# Patient Record
Sex: FEMALE | Race: WHITE | Employment: UNEMPLOYED | URBAN - METROPOLITAN AREA
[De-identification: names, ages, dates, MRNs, and addresses within clinical notes are randomized per-mention and may not be internally consistent; named-entity substitution may affect disease eponyms.]

---

## 2018-03-21 ENCOUNTER — OFFICE VISIT (OUTPATIENT)
Dept: OBGYN | Facility: CLINIC | Age: 23
End: 2018-03-21
Payer: COMMERCIAL

## 2018-03-21 VITALS
WEIGHT: 201.9 LBS | BODY MASS INDEX: 34.47 KG/M2 | DIASTOLIC BLOOD PRESSURE: 80 MMHG | HEIGHT: 64 IN | SYSTOLIC BLOOD PRESSURE: 116 MMHG | HEART RATE: 74 BPM

## 2018-03-21 DIAGNOSIS — Z01.419 ENCOUNTER FOR GYNECOLOGICAL EXAMINATION WITHOUT ABNORMAL FINDING: ICD-10-CM

## 2018-03-21 DIAGNOSIS — Z30.41 ENCOUNTER FOR BIRTH CONTROL PILLS MAINTENANCE: Primary | ICD-10-CM

## 2018-03-21 PROCEDURE — 99385 PREV VISIT NEW AGE 18-39: CPT | Mod: 25 | Performed by: ADVANCED PRACTICE MIDWIFE

## 2018-03-21 PROCEDURE — 2894A VOIDCORRECT: CPT | Performed by: ADVANCED PRACTICE MIDWIFE

## 2018-03-21 PROCEDURE — G0123 SCREEN CERV/VAG THIN LAYER: HCPCS | Performed by: ADVANCED PRACTICE MIDWIFE

## 2018-03-21 RX ORDER — NORETHINDRONE ACETATE AND ETHINYL ESTRADIOL 1MG-20(21)
1 KIT ORAL DAILY
Qty: 84 TABLET | Refills: 3 | Status: SHIPPED | OUTPATIENT
Start: 2018-03-21 | End: 2018-06-19

## 2018-03-21 NOTE — NURSING NOTE
"Chief Complaint   Patient presents with     Gyn Exam     Patient due for pap, unsure of last pap date- 4 years ago and was NIL     Abdominal Cramping     everyday for the past 6 weeks. Patient has hx of  Right ovarian cyst       Initial /80 (BP Location: Right arm, Patient Position: Chair, Cuff Size: Adult Regular)  Pulse 74  Ht 5' 4\" (1.626 m)  Wt 201 lb 14.4 oz (91.6 kg)  LMP 03/14/2018  BMI 34.66 kg/m2 Estimated body mass index is 34.66 kg/(m^2) as calculated from the following:    Height as of this encounter: 5' 4\" (1.626 m).    Weight as of this encounter: 201 lb 14.4 oz (91.6 kg).  Medication Reconciliation: complete     John Guillen, CMA      "

## 2018-03-21 NOTE — MR AVS SNAPSHOT
"              After Visit Summary   3/21/2018    Kirby Houser    MRN: [id number]           Patient Information     Date Of Birth          1995        Visit Information        Provider Department      3/21/2018 9:30 AM Ileana Alexandre APRN CNM [facility]        Today's Diagnoses     Encounter for birth control pills maintenance    -  1    Encounter for gynecological examination without abnormal finding           Follow-ups after your visit        Follow-up notes from your care team     Return in about 1 year (around 3/21/2019), or if symptoms worsen or fail to improve.      Who to contact     If you have questions or need follow up information about today's clinic visit or your schedule please contact [facility] directly at [phone].  Normal or non-critical lab and imaging results will be communicated to you by MyChart, letter or phone within 4 business days after the clinic has received the results. If you do not hear from us within 7 days, please contact the clinic through MyChart or phone. If you have a critical or abnormal lab result, we will notify you by phone as soon as possible.  Submit refill requests through [employer] or call your pharmacy and they will forward the refill request to us. Please allow 3 business days for your refill to be completed.          Additional Information About Your Visit        MyChart Information     [employer] lets you send messages to your doctor, view your test results, renew your prescriptions, schedule appointments and more. To sign up, go to [email or web address] . Click on \"Log in\" on the left side of the screen, which will take you to the Welcome page. Then click on \"Sign up Now\" on the right side of the page.     You will be asked to enter the access code listed below, as well as some personal information. Please follow the directions to create your username and password.     Your access code is: " [email or web address]  Expires: 2018  3:48 PM     Your access code will  in 90 days. If you need help or a new code, please call your [facility] clinic or [phone].        Care EveryWhere ID     This is your Care EveryWhere ID. This could be used by other organizations to access your [facility] medical records  [id number]        Your Vitals Were     Pulse Height Last Period BMI (Body Mass Index)          74 5' 4\" (1.626 m) 2018 34.66 kg/m2         Blood Pressure from Last 3 Encounters:   18 116/80   09/29/15 156/85   09/13/15 107/60    Weight from Last 3 Encounters:   18 201 lb 14.4 oz (91.6 kg)   09/29/15 191 lb 6.4 oz (86.8 kg)   09/12/15 188 lb 11.2 oz (85.6 kg)              We Performed the Following     Pap imaged thin layer screen only - recommended age 21 - 24 years          Today's Medication Changes          These changes are accurate as of 3/21/18  3:48 PM.  If you have any questions, ask your nurse or doctor.               Start taking these medicines.        Dose/Directions    norethindrone-ethinyl estradiol 1-20 MG-MCG per tablet   Commonly known as:  JUNEL FE 1/20   Used for:  Encounter for birth control pills maintenance   Started by:  Ileana Alexandre APRN CNM        Dose:  1 tablet   Take 1 tablet by mouth daily   Quantity:  84 tablet   Refills:  3            Where to get your medicines      These medications were sent to [facility] [address] [address]     Phone:  [phone]     norethindrone-ethinyl estradiol 1-20 MG-MCG per tablet                Primary Care Provider Office Phone # Fax #    [facility] [phone]       [address] [place] [address]        Equal Access to Services     SON LOMBARDI AH: Alan Silveira, kar brooks, carmina josephn ah. [facility] [phone].    ATENCIÓN: Si habla " español, tiene a shannon disposición servicios gratuitos de asistencia lingüística. Philip grijalva [phone].    We comply with applicable federal civil rights laws and [place] laws. We do not discriminate on the basis of race, color, national origin, age, disability, sex, sexual orientation, or gender identity.            Thank you!     Thank you for choosing [facility]  for your care. Our goal is always to provide you with excellent care. Hearing back from our patients is one way we can continue to improve our services. Please take a few minutes to complete the written survey that you may receive in the mail after your visit with us. Thank you!             Your Updated Medication List - Protect others around you: Learn how to safely use, store and throw away your medicines at www.disposemymeds.org.          This list is accurate as of 3/21/18  3:48 PM.  Always use your most recent med list.                   Brand Name Dispense Instructions for use Diagnosis    albuterol 108 (90 BASE) MCG/ACT Inhaler    PROAIR HFA/PROVENTIL HFA/VENTOLIN HFA     Inhale 2 puffs into the lungs as needed for shortness of breath / dyspnea or wheezing        norethindrone-ethinyl estradiol 1-20 MG-MCG per tablet    JUNEL FE 1/20    84 tablet    Take 1 tablet by mouth daily    Encounter for birth control pills maintenance

## 2018-03-21 NOTE — LETTER
March 30, 2018      Kirby Houser  [address]   [place] [address]    Dear ,      I am happy to inform you that your recent cervical cancer screening test (PAP smear) was normal.      Preventative screenings such as this help to ensure your health for years to come. You should repeat a pap smear in 3 years, unless otherwise directed.      You will still need to return to the clinic every year for your annual exam and other preventive tests.     Please contact the clinic at [phone] if you have further questions.       Sincerely,      ASHU Davila CNM/oskar

## 2018-03-21 NOTE — PROGRESS NOTES
SUBJECTIVE:                                                   Kirby Houser is a 23 year old who presents to clinic today for the following health issue(s):  Patient presents with:  Gyn Exam: Patient due for pap, unsure of last pap date- 4 years ago and was NIL  Abdominal Cramping: everyday for the past 6 weeks. Patient has hx of  Right ovarian cyst      HPI: Here for Pap and annual well woman exam.  Recently discontinued OCPs, about 2-3 weeks after discontinuation experiencing cramping. Reports feeling cramping every day, in AM and late at night.  Occurs with and without menses.  Denies one sided tenderness, urinary symptoms, vaginal symptoms, bowel changes.  Taking tylenol/ibuprofen for cramping states it helps. Hx of ovarian cyst.  Patient was adopted uncertain of [facility].  Denies personal hx of breast cancer, VTE, non smoker, migraines, liver disease.  Patient is interested in re starting birth control if it will help with cramping.     Patient's last menstrual period was 2018.  Menstrual History: frequency: every 28-30 days  Patient is sexually active  .  Using condoms for contraception.   Health maintenance updated:  yes  STI infx testing offered:  Declined      Problem list and histories reviewed & adjusted, as indicated.  Additional history: as documented.    Patient Active Problem List   Diagnosis     Hemorrhagic cyst of ovary     Past Surgical History:   Procedure Laterality Date     ABDOMEN SURGERY      lymph node removal      Social History   Substance Use Topics     Smoking status: Never Smoker     Smokeless tobacco: Never Used     Alcohol use Yes      *Patient is Adopted       *Family history is unknown by patient.             Current Outpatient Prescriptions   Medication Sig     albuterol (PROAIR HFA, PROVENTIL HFA, VENTOLIN HFA) 108 (90 BASE) MCG/ACT inhaler Inhale 2 puffs into the lungs as needed for shortness of breath / dyspnea or wheezing     No current facility-administered  "medications for this visit.      No Known Allergies    ROS:  C: NEGATIVE for fever, chills, change in weight  E: NEGATIVE for vision changes or irritation  R: NEGATIVE for significant cough or SOB  B: NEGATIVE for masses, tenderness or discharge  CV: NEGATIVE for chest pain, palpitations or peripheral edema  GI: NEGATIVE for nausea, abdominal pain, heartburn, or change in bowel habits  : NEGATIVE for unusual urinary or vaginal symptoms. Periods are regular.  P: NEGATIVE for changes in mood or affect    OBJECTIVE:     /80 (BP Location: Right arm, Patient Position: Chair, Cuff Size: Adult Regular)  Pulse 74  Ht 5' 4\" (1.626 m)  Wt 201 lb 14.4 oz (91.6 kg)  LMP 03/14/2018  BMI 34.66 kg/m2  Body mass index is 34.66 kg/(m^2).    PHYSICAL EXAM:  Constitutional:  Appearance: Well nourished, well developed alert, in no acute distress  Neck:  Lymph Nodes:  No lymphadenopathy present; Thyroid:  Gland size normal, nontender, no nodules or masses present on palpation  Chest:  Respiratory Effort:  Breathing unlabored  Cardiovascular: Heart: Auscultation:  Regular rate, normal rhythm, no murmurs present  Gastrointestinal:  Abdominal Examination:  Abdomen nontender to palpation, tone normal without rigidity or guarding, no masses present, umbilicus without lesions; Liver/Spleen:  No hepatomegaly present, liver nontender to palpation; Hernias:  No hernias present  Skin:General Inspection:  No rashes present, no lesions present, no areas of discoloration; Genitalia and Groin:  No rashes present, no lesions present, no areas of discoloration, no masses present.  Neurologic/Psychiatric:  Mental Status:  Oriented X3   Pelvic Exam:  External Genitalia:     Normal appearance for age, no discharge present, no tenderness present, no inflammatory lesions present, color normal  Vagina:     Normal vaginal vault without central or paravaginal defects, no discharge present, no inflammatory lesions present, no masses " present  Bladder:     Nontender to palpation  Urethra:   Urethral Body:  Urethra palpation normal, urethra structural support normal   Urethral Meatus:  No erythema or lesions present  Cervix:     Appearance healthy, no lesions present, nontender to palpation, no bleeding present  Uterus:     Uterus: firm, normal sized and nontender, midplane in position.   Adnexa:     No adnexal tenderness present, no adnexal masses present  Perineum:     Perineum within normal limits, no evidence of trauma, no rashes or skin lesions present  Anus:     Anus within normal limits, no hemorrhoids present  Inguinal Lymph Nodes:     No lymphadenopathy present  Pubic Hair:     Normal pubic hair distribution for age  Genitalia and Groin:     No rashes present, no lesions present, no areas of discoloration, no masses present       In-Clinic Test Results:  No results found for this or any previous visit (from the past 24 hour(s)).    ASSESSMENT/PLAN:                                                      (Z30.41) Encounter for birth control pills maintenance  (primary encounter diagnosis)  Comment: ordered   Plan: norethindrone-ethinyl estradiol (JUNEL FE 1/20)        1-20 MG-MCG per tablet, Pap imaged thin layer         screen only - recommended age 21 - 24 years  -Reviewed ACHES, appropriate candidate for OCPs  -discussed dysmenorrhea, COCs may help alleviate symptoms.    -Offered TVUS to evaluate structural causes for dysmenorrhea, reviewed possible symptom of endometriosis would require evaluation by OBGYN.   -Follow up with physician if symptoms worsen          (Z01.419) Encounter for gynecological examination without abnormal finding  Comment: wnl  Plan: return to clinic 1 year for annual well woman       30 minutes was spent face to face with the patient today discussing her history, diagnosis, and follow-up plan as noted above. Over 50% of the visit was spent in counseling and coordination of care.      ASHU Marinelli, MURRAY

## 2018-03-26 LAB
COPATH REPORT: NORMAL
PAP: NORMAL

## 2018-03-30 ENCOUNTER — HOSPITAL ENCOUNTER (OUTPATIENT)
Dept: ULTRASOUND IMAGING | Facility: CLINIC | Age: 23
Discharge: HOME | End: 2018-03-30
Attending: OBSTETRICS & GYNECOLOGY | Admitting: OBSTETRICS & GYNECOLOGY
Payer: COMMERCIAL

## 2018-03-30 ENCOUNTER — OFFICE VISIT (OUTPATIENT)
Dept: OBGYN | Facility: CLINIC | Age: 23
End: 2018-03-30
Payer: COMMERCIAL

## 2018-03-30 VITALS
TEMPERATURE: 98.6 F | WEIGHT: 202 LBS | DIASTOLIC BLOOD PRESSURE: 78 MMHG | SYSTOLIC BLOOD PRESSURE: 112 MMHG | BODY MASS INDEX: 34.67 KG/M2

## 2018-03-30 DIAGNOSIS — R10.9 COMBINED ABDOMINAL AND PELVIC PAIN: ICD-10-CM

## 2018-03-30 DIAGNOSIS — R10.9 COMBINED ABDOMINAL AND PELVIC PAIN: Primary | ICD-10-CM

## 2018-03-30 DIAGNOSIS — R10.2 COMBINED ABDOMINAL AND PELVIC PAIN: Primary | ICD-10-CM

## 2018-03-30 DIAGNOSIS — N83.209 HEMORRHAGIC CYST OF OVARY: ICD-10-CM

## 2018-03-30 DIAGNOSIS — R10.2 COMBINED ABDOMINAL AND PELVIC PAIN: ICD-10-CM

## 2018-03-30 PROCEDURE — 76856 US EXAM PELVIC COMPLETE: CPT

## 2018-03-30 PROCEDURE — 99214 OFFICE O/P EST MOD 30 MIN: CPT | Performed by: OBSTETRICS & GYNECOLOGY

## 2018-03-30 NOTE — PATIENT INSTRUCTIONS
You can reach your [facility] Care Team any time of the day by calling [phone]. This number will put you in touch with the 24 hour nurse line if the clinic is closed.    To contact your OB/GYN Surgery Scheduler please call [phone]. This is a direct number for your care team between 8 a.m. and 4 p.m. Monday through Friday.    [facility] is open for your convenience: [phone]  Monday through Friday 8 a.m. to 8:30 p.m.  Saturday 9 a.m. to 6 p.m.  Sunday Noon to 6 p.m.    They are closed on all major holidays.

## 2018-03-30 NOTE — NURSING NOTE
"Chief Complaint   Patient presents with     Pelvic Pain       Initial /78  Temp 98.6  F (37  C) (Oral)  Wt 202 lb (91.6 kg)  LMP 2018  BMI 34.67 kg/m2 Estimated body mass index is 34.67 kg/(m^2) as calculated from the following:    Height as of 3/21/18: 5' 4\" (1.626 m).    Weight as of this encounter: 202 lb (91.6 kg).  BP completed using cuff size: regular        The following HM Due: NONE      The following patient reported/Care Every where data was sent to:  P ABSTRACT QUALITY INITIATIVES [[id number]]      Chest pain last night  Nausea  ovarian pain     Concha Cardenas CMA                 "

## 2018-03-30 NOTE — MR AVS SNAPSHOT
After Visit Summary   3/30/2018    Kirby Houser    MRN: [id number]           Patient Information     Date Of Birth          1995        Visit Information        Provider Department      3/30/2018 8:45 AM Jakob Rodriguez MD [facility]        Today's Diagnoses     Combined abdominal and pelvic pain    -  1    Hemorrhagic cyst of ovary          Care Instructions    You can reach your [place] Care Team any time of the day by calling [phone]. This number will put you in touch with the 24 hour nurse line if the clinic is closed.    To contact your OB/GYN Surgery Scheduler please call [phone]. This is a direct number for your care team between 8 a.m. and 4 p.m. Monday through Friday.    [facility] is open for your convenience: [phone]  Monday through Friday 8 a.m. to 8:30 p.m.  Saturday 9 a.m. to 6 p.m.  Sunday Noon to 6 p.m.    They are closed on all major holidays.            Follow-ups after your visit        Who to contact     If you have questions or need follow up information about today's clinic visit or your schedule please contact [facility] directly at [phone].  Normal or non-critical lab and imaging results will be communicated to you by 4momshart, letter or phone within 4 business days after the clinic has received the results. If you do not hear from us within 7 days, please contact the clinic through 4momshart or phone. If you have a critical or abnormal lab result, we will notify you by phone as soon as possible.  Submit refill requests through [employer] or call your pharmacy and they will forward the refill request to us. Please allow 3 business days for your refill to be completed.          Additional Information About Your Visit        [employer] Information     [employer] lets you send messages to your doctor, view your test results, renew your prescriptions, schedule appointments and more. To sign up, go to  [email or web address] . Click on \"Log in\" on the left side of the screen, which will take you to the Welcome page. Then click on \"Sign up Now\" on the right side of the page.     You will be asked to enter the access code listed below, as well as some personal information. Please follow the directions to create your username and password.     Your access code is: [email or web address]  Expires: 2018  3:48 PM     Your access code will  in 90 days. If you need help or a new code, please call your [place] clinic or [phone].        Care EveryWhere ID     This is your Care EveryWhere ID. This could be used by other organizations to access your [place] medical records  [id number]        Your Vitals Were     Temperature Last Period BMI (Body Mass Index)             98.6  F (37  C) (Oral) 2018 34.67 kg/m2          Blood Pressure from Last 3 Encounters:   18 112/78   18 116/80   09/29/15 156/85    Weight from Last 3 Encounters:   18 202 lb (91.6 kg)   18 201 lb 14.4 oz (91.6 kg)   09/29/15 191 lb 6.4 oz (86.8 kg)               Primary Care Provider Fax #    Physician No Ref-Primary [phone]       No address on file        Equal Access to Services     SON LOMBARDI AH: Hadii reid bello Soalayna, waaxda luqadaha, qaybta kaalmada adeantoninoda, carmina franklin . So [facility] [phone].    ATENCIÓN: Si habla español, tiene a shannon disposición servicios gratuitos de asistencia lingüística. Llame al [phone].    We comply with applicable federal civil rights laws and [place] laws. We do not discriminate on the basis of race, color, national origin, age, disability, sex, sexual orientation, or gender identity.            Thank you!     Thank you for choosing [facility]  for your care. Our goal is always to provide you with excellent care. Hearing back from our patients is one way we can continue to improve our services. Please take a few " minutes to complete the written survey that you may receive in the mail after your visit with us. Thank you!             Your Updated Medication List - Protect others around you: Learn how to safely use, store and throw away your medicines at www.disposemymeds.org.          This list is accurate as of 3/30/18 11:59 PM.  Always use your most recent med list.                   Brand Name Dispense Instructions for use Diagnosis    albuterol 108 (90 BASE) MCG/ACT Inhaler    PROAIR HFA/PROVENTIL HFA/VENTOLIN HFA     Inhale 2 puffs into the lungs as needed for shortness of breath / dyspnea or wheezing        norethindrone-ethinyl estradiol 1-20 MG-MCG per tablet    JUNEL FE 1/20    84 tablet    Take 1 tablet by mouth daily    Encounter for birth control pills maintenance

## 2018-04-01 NOTE — PROGRESS NOTES
HPI:  Kirby Houser is a 23 year old female  Patient's last menstrual period was 2018.  Who just started [place] 121 week ago for contraception, who presents for evaluation of an acute onset of left lower quadrant abdominal pelvic pain that radiates to the suprapubic area.  The patient's pain began at approximately 8:30 PM last evening.  She describes it as lower abdominal cramping.  She states the pain can also radiate into the epigastric area and to the left shoulder.  She denies symptoms to suggest angina.  The patient's had a history of a ruptured ovarian cyst in the past and said the pain is similar.  Patient states she was laying in bed and when she rolls over or gets up the pain is worse.  Sitting and lying down seem to make the pain better.  She took Advil last evening with some relief.  She has a normal appetite and normal bowel bladder function without fevers chills nausea or vomiting.    Past Medical History:   Diagnosis Date     Ovarian cyst, right      Uncomplicated asthma 2006     Past Surgical History:   Procedure Laterality Date     ABDOMEN SURGERY      lymph node removal     Family History   Problem Relation Age of Onset     Adopted: Yes     Family history unknown: Yes     Social History     Social History     Marital status: Single     Spouse name: N/A     Number of children: N/A     Years of education: N/A     Occupational History     Not on file.     Social History Main Topics     Smoking status: Never Smoker     Smokeless tobacco: Never Used     Alcohol use Yes     Drug use: No     Sexual activity: Yes     Partners: Male     Birth control/ protection: Pill     Other Topics Concern     Not on file     Social History Narrative       Allergies:  Review of patient's allergies indicates no known allergies.    Current Outpatient Prescriptions   Medication Sig Dispense Refill     norethindrone-ethinyl estradiol (JUNEL FE 1/20) 1-20 MG-MCG per tablet Take 1 tablet by mouth daily 84  tablet 3     albuterol (PROAIR HFA, PROVENTIL HFA, VENTOLIN HFA) 108 (90 BASE) MCG/ACT inhaler Inhale 2 puffs into the lungs as needed for shortness of breath / dyspnea or wheezing         Review Of Systems   ROS: 10 point ROS neg other than the symptoms noted above in the HPI.    Exam:  /78  Temp 98.6  F (37  C) (Oral)  Wt 202 lb (91.6 kg)  LMP 03/14/2018  BMI 34.67 kg/m2  {Constitutional: healthy, alert and mild distress  Cardiovascular: negative, PMI normal. No lifts, heaves, or thrills. RRR. No murmurs, clicks gallops or rub  Respiratory: negative, Percussion normal. Good diaphragmatic excursion. Lungs clear  Gastrointestinal: Abdomen soft, mildly tender in the suprapubic and lower abdominal region.  Left lower quadrants more tender to deep palpation right lower quadrant no rebound or guarding. BS normal. No masses, organomegaly  Genitourinary: Normal external genitalia without lesions and speculum nulliparous appearing cervix no lesions, bimanual exam the uterus is retroflexed right adnexa is benign palpation of the left ovary reveals tenderness and duplicates her symptoms.  Rectovaginal exam no uterosacral ligament nodularity    Assessment/Plan:  (R10.30) Combined abdominal and pelvic pain  (primary encounter diagnosis)  Comment: The patient states in the past she has been told she had a history of endometriosis.  While this may be causing some degree of discomfort I do not believe is the cause of her most recent symptoms.  I ordered an ultrasound exam which showed the following  IMPRESSION: Hemorrhagic cyst or ruptured follicle in the left ovary  with a small amount of complex fluid.  I reviewed this finding with the patient discussed the pathophysiology  Plan: US Pelvic Complete w Transvaginal        We discussed activity limitations I discussed the use of ibuprofen and Tylenol for pain.  We discussed the symptoms of concern she will call should these occur.  She will continue with  OCPs    (N83.209) Hemorrhagic cyst of ovary  Comment: If her pain resolves I will see her for routine healthcare if she is worse appropriate intervention will be undertaken  Plan: Written plan given      Jakob Rodriguez M.D.

## 2018-06-19 ENCOUNTER — OFFICE VISIT (OUTPATIENT)
Dept: OBGYN | Facility: CLINIC | Age: 23
End: 2018-06-19
Payer: COMMERCIAL

## 2018-06-19 VITALS — DIASTOLIC BLOOD PRESSURE: 80 MMHG | BODY MASS INDEX: 34.5 KG/M2 | SYSTOLIC BLOOD PRESSURE: 122 MMHG | WEIGHT: 201 LBS

## 2018-06-19 DIAGNOSIS — Z30.41 ENCOUNTER FOR BIRTH CONTROL PILLS MAINTENANCE: Primary | ICD-10-CM

## 2018-06-19 DIAGNOSIS — Z87.42 HISTORY OF OVARIAN CYST: ICD-10-CM

## 2018-06-19 PROCEDURE — 99213 OFFICE O/P EST LOW 20 MIN: CPT | Performed by: ADVANCED PRACTICE MIDWIFE

## 2018-06-19 RX ORDER — NORGESTIMATE AND ETHINYL ESTRADIOL 0.25-0.035
1 KIT ORAL DAILY
Qty: 28 TABLET | Refills: 0 | Status: CANCELLED | OUTPATIENT
Start: 2018-06-19

## 2018-06-19 NOTE — MR AVS SNAPSHOT
After Visit Summary   6/19/2018    Kirby Houser    MRN: [id number]           Patient Information     Date Of Birth          1995        Visit Information        Provider Department      6/19/2018 9:15 AM Ileana Alexandre APRN CNM [facility]        Today's Diagnoses     Encounter for birth control pills maintenance    -  1    History of ovarian cyst           Follow-ups after your visit        Follow-up notes from your care team     Return in about 3 months (around 9/19/2018), or if symptoms worsen or fail to improve.      Future tests that were ordered for you today     Open Future Orders        Priority Expected Expires Ordered    Follicle stimulating hormone Routine  6/19/2019 6/19/2018    Lutropin Routine  6/19/2019 6/19/2018    Testosterone, total Routine  6/19/2019 6/19/2018    Sex Hormone Binding Globulin Routine  6/19/2019 6/19/2018    US Pelvic Complete w Transvaginal Routine  6/19/2019 6/19/2018            Who to contact     If you have questions or need follow up information about today's clinic visit or your schedule please contact [facility] directly at [phone].  Normal or non-critical lab and imaging results will be communicated to you by MyChart, letter or phone within 4 business days after the clinic has received the results. If you do not hear from us within 7 days, please contact the clinic through MyChart or phone. If you have a critical or abnormal lab result, we will notify you by phone as soon as possible.  Submit refill requests through [employer] or call your pharmacy and they will forward the refill request to us. Please allow 3 business days for your refill to be completed.          Additional Information About Your Visit        Care EveryWhere ID     This is your Care EveryWhere ID. This could be used by other organizations to access your [place] medical records  [id number]        Your Vitals Were     Last Period BMI (Body Mass Index)                 06/11/2018 (Exact Date) 34.5 kg/m2           Blood Pressure from Last 3 Encounters:   06/19/18 122/80   03/30/18 112/78   03/21/18 116/80    Weight from Last 3 Encounters:   06/19/18 201 lb (91.2 kg)   03/30/18 202 lb (91.6 kg)   03/21/18 201 lb 14.4 oz (91.6 kg)                 Today's Medication Changes          These changes are accurate as of 6/19/18  2:05 PM.  If you have any questions, ask your nurse or doctor.               Start taking these medicines.        Dose/Directions    norgestrel-ethinyl estradiol 0.3-30 MG-MCG per tablet   Commonly known as:  LO/OVRAL   Used for:  Encounter for birth control pills maintenance   Started by:  Ileana Alexandre APRN CNM        Dose:  1 tablet   Take 1 tablet by mouth daily   Quantity:  28 tablet   Refills:  11         Stop taking these medicines if you haven't already. Please contact your care team if you have questions.     norethindrone-ethinyl estradiol 1-20 MG-MCG per tablet   Commonly known as:  JUNEL FE 1/20   Stopped by:  Ileana Alexandre APRN CNM                Where to get your medicines      These medications were sent to [facility] [address] [address]     Phone:  [phone]     norgestrel-ethinyl estradiol 0.3-30 MG-MCG per tablet                Primary Care Provider Fax #    Physician No Ref-Primary [phone]       No address on file        Equal Access to Services     CAT LOMBARDI : Alan pascual Soalayna, waaxda luqadaha, qaybta kaalmada adejaylon, carmina harvey. So [facility] [phone].    ATENCIÓN: Si habla español, tiene a shannon disposición servicios gratuitos de asistencia lingüística. Llame al [phone].    We comply with applicable federal civil rights laws and [place] laws. We do not discriminate on the basis of race, color, national origin, age, disability, sex, sexual orientation, or gender identity.            Thank  you!     Thank you for choosing [facility]  for your care. Our goal is always to provide you with excellent care. Hearing back from our patients is one way we can continue to improve our services. Please take a few minutes to complete the written survey that you may receive in the mail after your visit with us. Thank you!             Your Updated Medication List - Protect others around you: Learn how to safely use, store and throw away your medicines at www.disposemymeds.org.          This list is accurate as of 6/19/18  2:05 PM.  Always use your most recent med list.                   Brand Name Dispense Instructions for use Diagnosis    albuterol 108 (90 Base) MCG/ACT Inhaler    PROAIR HFA/PROVENTIL HFA/VENTOLIN HFA     Inhale 2 puffs into the lungs as needed for shortness of breath / dyspnea or wheezing        norgestrel-ethinyl estradiol 0.3-30 MG-MCG per tablet    LO/OVRAL    28 tablet    Take 1 tablet by mouth daily    Encounter for birth control pills maintenance

## 2018-06-19 NOTE — NURSING NOTE
"Chief Complaint   Patient presents with     Abdominal Cramping     Daily uterine cramping- cysts on ovaries- painful periods progressively getting worse- on Junel OCP        Initial /80 (BP Location: Right arm, Patient Position: Sitting, Cuff Size: Adult Regular)  Wt 201 lb (91.2 kg)  LMP 2018 (Exact Date)  BMI 34.5 kg/m2 Estimated body mass index is 34.5 kg/(m^2) as calculated from the following:    Height as of 3/21/18: 5' 4\" (1.626 m).    Weight as of this encounter: 201 lb (91.2 kg).  BP completed using cuff size: regular        The following HM Due: NONE    patient has appointment for today.  Nuno Wayne CMA                 "

## 2018-06-19 NOTE — PROGRESS NOTES
SUBJECTIVE:  Kiryb Houser is a 23 year old woman here for contraceptive management.  HPI: Here to discuss cramping with menses. Started on Junel OCP in March for dysmennorhea, shortly after starting was seen in office for hemorrhagic left ovarian cyst.  Patient reports she missed last month menses, this week reports early menses with severe cramping.  Patient missed work with the lasts menses reporting dysmenorrhea was so severe she couldn't get off the couch.  Reports taking pill same time every day, has not missed dose.      MENSTRUAL HISTORY  ==================    Patient's last menstrual period was 2018 (exact date).    Periods are regular q 28-30 days,  Dysmenorrhea severe, occurring throughout cycle and menses.  Additional symptoms include NONE    SEXUAL HISTORY  ==============  Current contraception: oral contraceptives  ACHES reviewed and WNL: yes  Pain with intercourse:No  Bleeding with intercourse:No  History of STD/ Vaginal infections:No STD history  Urinary symptoms:no    HISTORIES  =========  Medical, surgical, and family histories as well as medications and allergies reviewed and updated in this encounter.    REVIEW OF SYSTEMS  ==================  CONSTITUTIONAL: NEGATIVE for fever, chills  EYES: NEGATIVE for vision changes   RESP: NEGATIVE for significant cough or SOB  CV: NEGATIVE for chest pain, palpitations   GI: NEGATIVE for nausea, abdominal pain, heartburn, or change in bowel habits  : NEGATIVE for frequency, dysuria, or hematuria  PSYCHIATRIC: NEGATIVE for changes in mood or affect    EXAM  =========  /80 (BP Location: Right arm, Patient Position: Sitting, Cuff Size: Adult Regular)  Wt 201 lb (91.2 kg)  LMP 2018 (Exact Date)  BMI 34.5 kg/m2    GENERAL APPEARANCE: healthy, alert and no distress  RESP: lungs clear to auscultation - no rales, rhonchi or wheezes  CV: regular rates and rhythm  MENTAL STATUS EXAM:  Appearance/Behavior: No apparent  distress    ASSESSMENT  ===========  (Z30.41) Encounter for birth control pills maintenance  (primary encounter diagnosis)  Comment: prescription sent   Plan: US Pelvic Complete w Transvaginal,         norgestrel-ethinyl estradiol (LO/OVRAL) 0.3-30         MG-MCG per tablet        -ACHES reviewed, will increase Estrogen and change progesterone to help with dysmenorrhea, Reviewed other birth control options. Patient is a good candidate for RACHELLE d/t hx of cysts. Prefers to stay with an OCP.   -Ordered f/u ultrasound for right ovarian cyst. Suspect patient has PCOS or endometriosis based on symptoms.   -Patient desires PCOS labs. Reviewed to return to clinic on day 3 of menses.     PATIENT EDUCATION  =================  1.  Discussed with patient risks/ benefits and treatment options of prescribed medications or other treatment modalities.  2.  Discussed STD/ safe sex precautions.  3.  RTC in 3 mo for management of RACHELLE    ASHU Marinelli, RAZM

## 2018-06-28 ENCOUNTER — RADIANT APPOINTMENT (OUTPATIENT)
Dept: ULTRASOUND IMAGING | Facility: CLINIC | Age: 23
End: 2018-06-28
Attending: ADVANCED PRACTICE MIDWIFE
Payer: COMMERCIAL

## 2018-06-28 DIAGNOSIS — Z30.41 ENCOUNTER FOR BIRTH CONTROL PILLS MAINTENANCE: ICD-10-CM

## 2018-06-28 PROCEDURE — 76830 TRANSVAGINAL US NON-OB: CPT | Performed by: OBSTETRICS & GYNECOLOGY

## 2018-06-28 PROCEDURE — 76856 US EXAM PELVIC COMPLETE: CPT | Performed by: OBSTETRICS & GYNECOLOGY

## 2018-09-20 ENCOUNTER — RADIANT APPOINTMENT (OUTPATIENT)
Dept: GENERAL RADIOLOGY | Facility: CLINIC | Age: 23
End: 2018-09-20
Attending: PHYSICIAN ASSISTANT
Payer: COMMERCIAL

## 2018-09-20 ENCOUNTER — NURSE TRIAGE (OUTPATIENT)
Dept: NURSING | Facility: CLINIC | Age: 23
End: 2018-09-20

## 2018-09-20 ENCOUNTER — OFFICE VISIT (OUTPATIENT)
Dept: URGENT CARE | Facility: URGENT CARE | Age: 23
End: 2018-09-20
Payer: COMMERCIAL

## 2018-09-20 VITALS
TEMPERATURE: 98.2 F | DIASTOLIC BLOOD PRESSURE: 86 MMHG | HEART RATE: 68 BPM | OXYGEN SATURATION: 100 % | BODY MASS INDEX: 35.98 KG/M2 | WEIGHT: 209.6 LBS | SYSTOLIC BLOOD PRESSURE: 110 MMHG

## 2018-09-20 DIAGNOSIS — R07.1 PAINFUL RESPIRATION: ICD-10-CM

## 2018-09-20 DIAGNOSIS — R07.89 ATYPICAL CHEST PAIN: ICD-10-CM

## 2018-09-20 DIAGNOSIS — R07.89 ATYPICAL CHEST PAIN: Primary | ICD-10-CM

## 2018-09-20 LAB
ALBUMIN SERPL-MCNC: 3.9 G/DL (ref 3.4–5)
ALP SERPL-CCNC: 67 U/L (ref 40–150)
ALT SERPL W P-5'-P-CCNC: 16 U/L (ref 0–50)
ANION GAP SERPL CALCULATED.3IONS-SCNC: 6 MMOL/L (ref 3–14)
AST SERPL W P-5'-P-CCNC: 25 U/L (ref 0–45)
BASOPHILS # BLD AUTO: 0 10E9/L (ref 0–0.2)
BASOPHILS NFR BLD AUTO: 0.4 %
BILIRUB SERPL-MCNC: 0.6 MG/DL (ref 0.2–1.3)
BUN SERPL-MCNC: 9 MG/DL (ref 7–30)
CALCIUM SERPL-MCNC: 9.5 MG/DL (ref 8.5–10.1)
CHLORIDE SERPL-SCNC: 105 MMOL/L (ref 94–109)
CO2 SERPL-SCNC: 28 MMOL/L (ref 20–32)
CREAT SERPL-MCNC: 0.8 MG/DL (ref 0.52–1.04)
D DIMER PPP FEU-MCNC: 0.2 UG/ML FEU (ref 0–0.5)
DIFFERENTIAL METHOD BLD: NORMAL
EOSINOPHIL # BLD AUTO: 0.1 10E9/L (ref 0–0.7)
EOSINOPHIL NFR BLD AUTO: 1.4 %
ERYTHROCYTE [DISTWIDTH] IN BLOOD BY AUTOMATED COUNT: 12.7 % (ref 10–15)
ERYTHROCYTE [SEDIMENTATION RATE] IN BLOOD BY WESTERGREN METHOD: 5 MM/H (ref 0–20)
GFR SERPL CREATININE-BSD FRML MDRD: 88 ML/MIN/1.7M2
GLUCOSE SERPL-MCNC: 87 MG/DL (ref 70–99)
HCT VFR BLD AUTO: 42.5 % (ref 35–47)
HGB BLD-MCNC: 13.8 G/DL (ref 11.7–15.7)
LYMPHOCYTES # BLD AUTO: 2.5 10E9/L (ref 0.8–5.3)
LYMPHOCYTES NFR BLD AUTO: 29.3 %
MCH RBC QN AUTO: 29.7 PG (ref 26.5–33)
MCHC RBC AUTO-ENTMCNC: 32.5 G/DL (ref 31.5–36.5)
MCV RBC AUTO: 92 FL (ref 78–100)
MONOCYTES # BLD AUTO: 0.5 10E9/L (ref 0–1.3)
MONOCYTES NFR BLD AUTO: 5.7 %
NEUTROPHILS # BLD AUTO: 5.4 10E9/L (ref 1.6–8.3)
NEUTROPHILS NFR BLD AUTO: 63.2 %
PLATELET # BLD AUTO: 247 10E9/L (ref 150–450)
POTASSIUM SERPL-SCNC: 4.2 MMOL/L (ref 3.4–5.3)
PROT SERPL-MCNC: 7.3 G/DL (ref 6.8–8.8)
RBC # BLD AUTO: 4.64 10E12/L (ref 3.8–5.2)
SODIUM SERPL-SCNC: 139 MMOL/L (ref 133–144)
TROPONIN I SERPL-MCNC: <0.015 UG/L (ref 0–0.04)
WBC # BLD AUTO: 8.5 10E9/L (ref 4–11)

## 2018-09-20 PROCEDURE — 80053 COMPREHEN METABOLIC PANEL: CPT | Performed by: PHYSICIAN ASSISTANT

## 2018-09-20 PROCEDURE — 84484 ASSAY OF TROPONIN QUANT: CPT | Performed by: PHYSICIAN ASSISTANT

## 2018-09-20 PROCEDURE — 85025 COMPLETE CBC W/AUTO DIFF WBC: CPT | Performed by: PHYSICIAN ASSISTANT

## 2018-09-20 PROCEDURE — 85379 FIBRIN DEGRADATION QUANT: CPT | Performed by: PHYSICIAN ASSISTANT

## 2018-09-20 PROCEDURE — 99205 OFFICE O/P NEW HI 60 MIN: CPT | Performed by: PHYSICIAN ASSISTANT

## 2018-09-20 PROCEDURE — 93000 ELECTROCARDIOGRAM COMPLETE: CPT | Performed by: PHYSICIAN ASSISTANT

## 2018-09-20 PROCEDURE — 85652 RBC SED RATE AUTOMATED: CPT | Performed by: PHYSICIAN ASSISTANT

## 2018-09-20 PROCEDURE — 36415 COLL VENOUS BLD VENIPUNCTURE: CPT | Performed by: PHYSICIAN ASSISTANT

## 2018-09-20 PROCEDURE — 71046 X-RAY EXAM CHEST 2 VIEWS: CPT | Mod: FY

## 2018-09-20 RX ORDER — IBUPROFEN 800 MG/1
800 TABLET, FILM COATED ORAL EVERY 8 HOURS PRN
Qty: 30 TABLET | Refills: 1 | Status: SHIPPED | OUTPATIENT
Start: 2018-09-20

## 2018-09-20 RX ORDER — NORGESTIMATE AND ETHINYL ESTRADIOL 0.25-0.035
1 KIT ORAL DAILY
COMMUNITY
End: 2018-11-29 | Stop reason: ALTCHOICE

## 2018-09-20 NOTE — TELEPHONE ENCOUNTER
Chest pain last night. It resolved but has returned today.  It's like there is a weight in her chest. Her breathing is a little difficult though she hasn't used her albuterol. She has mild asthma. She said this does not feel like asthma. I instructed 911.  She is not going to do this.  I suggested she find someone then to  her to an ER.  I also recommended she use her albuterol. She stated understanding and agreement.    Reason for Disposition    [1] Chest pain lasts > 5 minutes AND [2] described as crushing, pressure-like, or heavy    Additional Information    Negative: Severe difficulty breathing (e.g., struggling for each breath, speaks in single words)    Negative: Difficult to awaken or acting confused (e.g., disoriented, slurred speech)    Negative: Shock suspected (e.g., cold/pale/clammy skin, too weak to stand, low BP, rapid pulse)    Negative: [1] Chest pain lasts > 5 minutes AND [2] history of heart disease  (i.e., heart attack, bypass surgery, angina, angioplasty, CHF; not just a heart murmur)    Protocols used: CHEST PAIN-ADULT-AH  Kezia VELOZ RN [facility]

## 2018-09-20 NOTE — MR AVS SNAPSHOT
After Visit Summary   9/20/2018    Kirby Houser    MRN: [id number]           Patient Information     Date Of Birth          1995        Visit Information        Provider Department      9/20/2018 1:30 PM Alberto Zhu PA-C [facility]        Today's Diagnoses     Atypical chest pain    -  1    Painful respiration          Care Instructions      Chest Wall Pain: Costochondritis    The chest pain that you have had today is caused by costochondritis. This condition is caused by an inflammation of the cartilage joining your ribs to your breastbone. It is not caused by heart or lung problems. Your healthcare team has made sure that the chest pain you feel is not from a life threatening cause of chest pain such as heart attack, collapsed lung, blood clot in the lung, tear in the aorta, or esophageal rupture. The inflammation may have been brought on by a blow to the chest, lifting heavy objects, intense exercise, or an illness that made you cough and sneeze a lot. It often occurs during times of emotional stress. It can be painful, but it is not dangerous. It usually goes away in 1 to 2 weeks. But it may happen again. Rarely, a more serious condition may cause symptoms similar to costochondritis. That s why it s important to watch for the warning signs listed below.  Home care  Follow these guidelines when caring for yourself at home:    If you feel that emotional stress is a cause of your condition, try to figure out the sources of that stress. It may not be obvious. Learn ways to deal with the stress in your life. This can include regular exercise, muscle relaxation, meditation, or simply taking time out for yourself.    You may use acetaminophen, ibuprofen, or naproxen to control pain, unless another pain medicine was prescribed. If you have liver or kidney disease or ever had a stomach ulcer, talk with your healthcare provider before using these medicines.    You can  also help ease pain by using a hot, wet compress or heating pad. Use this with or without a medicated skin cream that helps relieves pain.    Do stretching exercise as advised by your provider.    Take any prescribed medicines as directed.  Follow-up care  Follow up with your healthcare provider, or as advised, if you do not start to get better in the next 2 days.  When to seek medical advice  Call your healthcare provider right away if any of these occur:    A change in the type of pain. Call if it feels different, becomes more serious, lasts longer, or spreads into your shoulder, arm, neck, jaw, or back.    Shortness of breath or pain gets worse when you breathe    Weakness, dizziness, or fainting    Cough with dark-colored sputum (phlegm) or blood    Abdominal pain    Dark red or black stools    Fever of 100.4 F (38 C) or higher, or as directed by your healthcare provider  Date Last Reviewed: 12/1/2016 2000-2017 [employer]. [address] [place] [address]. All rights reserved. This information is not intended as a substitute for professional medical care. Always follow your healthcare professional's instructions.                Follow-ups after your visit        Who to contact     If you have questions or need follow up information about today's clinic visit or your schedule please contact [facility] directly at [phone].  Normal or non-critical lab and imaging results will be communicated to you by MyChart, letter or phone within 4 business days after the clinic has received the results. If you do not hear from us within 7 days, please contact the clinic through MyChart or phone. If you have a critical or abnormal lab result, we will notify you by phone as soon as possible.  Submit refill requests through [employer] or call your pharmacy and they will forward the refill request to us. Please allow 3 business days for your refill to be completed.           Additional Information About Your Visit        MyChart Information     [employer] gives you secure access to your electronic health record. If you see a primary care provider, you can also send messages to your care team and make appointments. If you have questions, please call your primary care clinic.  If you do not have a primary care provider, please call [phone] and they will assist you.        Care EveryWhere ID     This is your Care EveryWhere ID. This could be used by other organizations to access your [facility] medical records  [id number]        Your Vitals Were     Pulse Temperature Pulse Oximetry BMI (Body Mass Index)          68 98.2  F (36.8  C) (Oral) 100% 35.98 kg/m2         Blood Pressure from Last 3 Encounters:   09/20/18 110/86   06/19/18 122/80   03/30/18 112/78    Weight from Last 3 Encounters:   09/20/18 209 lb 9.6 oz (95.1 kg)   06/19/18 201 lb (91.2 kg)   03/30/18 202 lb (91.6 kg)              We Performed the Following     CBC with platelets differential     Comprehensive metabolic panel     D dimer quantitative     EKG 12-lead complete w/read - Clinics     Erythrocyte sedimentation rate auto     Troponin I          Today's Medication Changes          These changes are accurate as of 9/20/18  4:32 PM.  If you have any questions, ask your nurse or doctor.               Start taking these medicines.        Dose/Directions    ibuprofen 800 MG tablet   Commonly known as:  ADVIL/MOTRIN   Used for:  Atypical chest pain   Started by:  Alberto Zhu PA-C        Dose:  800 mg   Take 1 tablet (800 mg) by mouth every 8 hours as needed for moderate pain   Quantity:  30 tablet   Refills:  1            Where to get your medicines      These medications were sent to [facility] [address] [address]     Phone:  [phone]     ibuprofen 800 MG tablet                Primary Care Provider Fax #    Physician No Ref-Primary  [phone]       No address on file        Equal Access to Services     SON MARIA C : Hadii reid viveros samara Silveira, wabethda saumyarach, syl berg priyaantoninomi, waxdavi ositoin hayaajermaine powersjarred raygold harvey. So [facility] [phone].    ATENCIÓN: Si habla español, tiene a shannon disposición servicios gratuitos de asistencia lingüística. [facility] [phone].    We comply with applicable federal civil rights laws and [place] laws. We do not discriminate on the basis of race, color, national origin, age, disability, sex, sexual orientation, or gender identity.            Thank you!     Thank you for choosing [place] [facility]  for your care. Our goal is always to provide you with excellent care. Hearing back from our patients is one way we can continue to improve our services. Please take a few minutes to complete the written survey that you may receive in the mail after your visit with us. Thank you!             Your Updated Medication List - Protect others around you: Learn how to safely use, store and throw away your medicines at www.disposemymeds.org.          This list is accurate as of 9/20/18  4:32 PM.  Always use your most recent med list.                   Brand Name Dispense Instructions for use Diagnosis    albuterol 108 (90 Base) MCG/ACT inhaler    PROAIR HFA/PROVENTIL HFA/VENTOLIN HFA     Inhale 2 puffs into the lungs as needed for shortness of breath / dyspnea or wheezing        ibuprofen 800 MG tablet    ADVIL/MOTRIN    30 tablet    Take 1 tablet (800 mg) by mouth every 8 hours as needed for moderate pain    Atypical chest pain       norgestrel-ethinyl estradiol 0.3-30 MG-MCG per tablet    LO/OVRAL    28 tablet    Take 1 tablet by mouth daily    Encounter for birth control pills maintenance       ORTHO-CYCLEN (28) 0.25-35 MG-MCG per tablet   Generic drug:  norgestimate-ethinyl estradiol      Take 1 tablet by mouth daily    Atypical chest pain, Painful respiration

## 2018-09-20 NOTE — PATIENT INSTRUCTIONS
Chest Wall Pain: Costochondritis    The chest pain that you have had today is caused by costochondritis. This condition is caused by an inflammation of the cartilage joining your ribs to your breastbone. It is not caused by heart or lung problems. Your healthcare team has made sure that the chest pain you feel is not from a life threatening cause of chest pain such as heart attack, collapsed lung, blood clot in the lung, tear in the aorta, or esophageal rupture. The inflammation may have been brought on by a blow to the chest, lifting heavy objects, intense exercise, or an illness that made you cough and sneeze a lot. It often occurs during times of emotional stress. It can be painful, but it is not dangerous. It usually goes away in 1 to 2 weeks. But it may happen again. Rarely, a more serious condition may cause symptoms similar to costochondritis. That s why it s important to watch for the warning signs listed below.  Home care  Follow these guidelines when caring for yourself at home:    If you feel that emotional stress is a cause of your condition, try to figure out the sources of that stress. It may not be obvious. Learn ways to deal with the stress in your life. This can include regular exercise, muscle relaxation, meditation, or simply taking time out for yourself.    You may use acetaminophen, ibuprofen, or naproxen to control pain, unless another pain medicine was prescribed. If you have liver or kidney disease or ever had a stomach ulcer, talk with your healthcare provider before using these medicines.    You can also help ease pain by using a hot, wet compress or heating pad. Use this with or without a medicated skin cream that helps relieves pain.    Do stretching exercise as advised by your provider.    Take any prescribed medicines as directed.  Follow-up care  Follow up with your healthcare provider, or as advised, if you do not start to get better in the next 2 days.  When to seek medical  advice  Call your healthcare provider right away if any of these occur:    A change in the type of pain. Call if it feels different, becomes more serious, lasts longer, or spreads into your shoulder, arm, neck, jaw, or back.    Shortness of breath or pain gets worse when you breathe    Weakness, dizziness, or fainting    Cough with dark-colored sputum (phlegm) or blood    Abdominal pain    Dark red or black stools    Fever of 100.4 F (38 C) or higher, or as directed by your healthcare provider  Date Last Reviewed: 12/1/2016 2000-2017 [employer]. [address] [place] [address]. All rights reserved. This information is not intended as a substitute for professional medical care. Always follow your healthcare professional's instructions.

## 2018-09-20 NOTE — PROGRESS NOTES
SUBJECTIVE:  Kirby Houser is a 23 year old female who presents to the office with the CC of chest pain.  Patient complains of chest pain.  The pain is characterized as mild and moderate dull and sharp located mid sternal with radiation to none. Symptoms began 2 day(s) ago, still present  Pain is associated with movements, breathing.  Pain is exacerbated by pressing on chest and movements.  Pain is relieved by nothing.  Cardiac risk factors: none    Past Medical History:   Diagnosis Date     Ovarian cyst, right      Uncomplicated asthma 2006     ALLERGIES  No Known Allergies     FAM HX  Depression   HTN  Allergies  CVD    Social History   Substance Use Topics     Smoking status: Never Smoker     Smokeless tobacco: Never Used     Alcohol use Yes       ROS:CONSTITUTIONAL:NEGATIVE for fever, chills, change in weight  INTEGUMENTARY/SKIN: NEGATIVE for worrisome rashes, moles or lesions  EYES: NEGATIVE for vision changes or irritation  ENT/MOUTH: NEGATIVE for ear, mouth and throat problems  RESP:POSITIVE for pain with breathing  CV: POSITIVE for chest wall tenderness, pain with movements  GI: NEGATIVE for nausea, abdominal pain, heartburn, or change in bowel habits  : normal menstrual cycles  MUSCULOSKELETAL: POSITIVE for chest wall tenderness  NEURO: NEGATIVE for weakness, dizziness or paresthesias  ENDOCRINE: NEGATIVE for temperature intolerance, skin/hair changes    EXAM:  /86  Pulse 68  Temp 98.2  F (36.8  C) (Oral)  Wt 209 lb 9.6 oz (95.1 kg)  SpO2 100%  BMI 35.98 kg/m2  GENERAL APPEARANCE: healthy, alert and no distress  EYES: EOMI,  PERRL, conjunctiva clear  HENT: ear canals and TM's normal.  Nose and mouth without ulcers, erythema or lesions  NECK: supple, nontender, no lymphadenopathy  RESP: lungs clear to auscultation - no rales, rhonchi or wheezes  CV: regular rates and rhythm, normal S1 S2, no murmur noted  ABDOMEN:  soft, nontender, no HSM or masses and bowel sounds normal  Extremities: no  peripheral edema or tenderness, peripheral pulses normal  MS: Positive for chest wall tenderness  NEURO: Normal strength and tone, sensory exam grossly normal,  normal speech and mentation  SKIN: no suspicious lesions or rashes     Office EKG demonstrates:  appears normal, NSR,normal axis, normal intervals, no acute ST/T changes c/w ischemia,no LVH by voltage criteria,unchanged from previous tracings    Chest xray Negative for acute findings, read by Alberto GILBERT at time of visit.    Results for orders placed or performed in visit on 09/20/18   Erythrocyte sedimentation rate auto   Result Value Ref Range    Sed Rate 5 0 - 20 mm/h   CBC with platelets differential   Result Value Ref Range    WBC 8.5 4.0 - 11.0 10e9/L    RBC Count 4.64 3.8 - 5.2 10e12/L    Hemoglobin 13.8 11.7 - 15.7 g/dL    Hematocrit 42.5 35.0 - 47.0 %    MCV 92 78 - 100 fl    MCH 29.7 26.5 - 33.0 pg    MCHC 32.5 31.5 - 36.5 g/dL    RDW 12.7 10.0 - 15.0 %    Platelet Count 247 150 - 450 10e9/L    % Neutrophils 63.2 %    % Lymphocytes 29.3 %    % Monocytes 5.7 %    % Eosinophils 1.4 %    % Basophils 0.4 %    Absolute Neutrophil 5.4 1.6 - 8.3 10e9/L    Absolute Lymphocytes 2.5 0.8 - 5.3 10e9/L    Absolute Monocytes 0.5 0.0 - 1.3 10e9/L    Absolute Eosinophils 0.1 0.0 - 0.7 10e9/L    Absolute Basophils 0.0 0.0 - 0.2 10e9/L    Diff Method Automated Method    Comprehensive metabolic panel   Result Value Ref Range    Sodium 139 133 - 144 mmol/L    Potassium 4.2 3.4 - 5.3 mmol/L    Chloride 105 94 - 109 mmol/L    Carbon Dioxide 28 20 - 32 mmol/L    Anion Gap 6 3 - 14 mmol/L    Glucose 87 70 - 99 mg/dL    Urea Nitrogen 9 7 - 30 mg/dL    Creatinine 0.80 0.52 - 1.04 mg/dL    GFR Estimate 88 >60 mL/min/1.7m2    GFR Estimate If Black >90 >60 mL/min/1.7m2    Calcium 9.5 8.5 - 10.1 mg/dL    Bilirubin Total 0.6 0.2 - 1.3 mg/dL    Albumin 3.9 3.4 - 5.0 g/dL    Protein Total 7.3 6.8 - 8.8 g/dL    Alkaline Phosphatase 67 40 - 150 U/L    ALT 16 0 - 50 U/L     AST 25 0 - 45 U/L   D dimer quantitative   Result Value Ref Range    D Dimer 0.2 0.0 - 0.50 ug/ml FEU   Troponin I   Result Value Ref Range    Troponin I ES <0.015 0.000 - 0.045 ug/L       Assessment  / IMPRESSION/PLAN:      ICD-10-CM    1. Atypical chest pain R07.89 norgestimate-ethinyl estradiol (ORTHO-CYCLEN, 28,) 0.25-35 MG-MCG per tablet     XR Chest 2 Views     Erythrocyte sedimentation rate auto     CBC with platelets differential     Comprehensive metabolic panel     D dimer quantitative     Troponin I     EKG 12-lead complete w/read - Clinics     ibuprofen (ADVIL/MOTRIN) 800 MG tablet   2. Painful respiration R07.1 norgestimate-ethinyl estradiol (ORTHO-CYCLEN, 28,) 0.25-35 MG-MCG per tablet     XR Chest 2 Views     Erythrocyte sedimentation rate auto     CBC with platelets differential     Comprehensive metabolic panel     D dimer quantitative     Troponin I     EKG 12-lead complete w/read - Clinics       Orders Placed This Encounter     XR Chest 2 Views     Erythrocyte sedimentation rate auto     CBC with platelets differential     Comprehensive metabolic panel     D dimer quantitative     Troponin I     norgestimate-ethinyl estradiol (ORTHO-CYCLEN, 28,) 0.25-35 MG-MCG per tablet     ibuprofen (ADVIL/MOTRIN) 800 MG tablet       D-dimer to rule out PE  Chest xray to rule out pneumothorax  Cbc to rule out anemia  Troponin and EKG to rule out EKG  Advised to take motrin  Follow up with PCP as needed  Go to the ED if symptoms worsen

## 2018-10-29 ENCOUNTER — OFFICE VISIT (OUTPATIENT)
Dept: NEUROSURGERY | Facility: CLINIC | Age: 23
End: 2018-10-29
Attending: NURSE PRACTITIONER
Payer: COMMERCIAL

## 2018-10-29 ENCOUNTER — RADIANT APPOINTMENT (OUTPATIENT)
Dept: GENERAL RADIOLOGY | Facility: CLINIC | Age: 23
End: 2018-10-29
Attending: NURSE PRACTITIONER
Payer: COMMERCIAL

## 2018-10-29 VITALS
HEART RATE: 77 BPM | SYSTOLIC BLOOD PRESSURE: 117 MMHG | WEIGHT: 210 LBS | HEIGHT: 64 IN | BODY MASS INDEX: 35.85 KG/M2 | DIASTOLIC BLOOD PRESSURE: 81 MMHG

## 2018-10-29 DIAGNOSIS — M54.50 LUMBAR SPINE PAIN: ICD-10-CM

## 2018-10-29 DIAGNOSIS — M54.50 LUMBAR SPINE PAIN: Primary | ICD-10-CM

## 2018-10-29 PROCEDURE — 72120 X-RAY BEND ONLY L-S SPINE: CPT

## 2018-10-29 PROCEDURE — G0463 HOSPITAL OUTPT CLINIC VISIT: HCPCS

## 2018-10-29 PROCEDURE — 99203 OFFICE O/P NEW LOW 30 MIN: CPT | Performed by: NURSE PRACTITIONER

## 2018-10-29 RX ORDER — CYCLOBENZAPRINE HCL 10 MG
TABLET ORAL
COMMUNITY
Start: 2018-10-12

## 2018-10-29 ASSESSMENT — PAIN SCALES - GENERAL: PAINLEVEL: MILD PAIN (2)

## 2018-10-29 NOTE — PATIENT INSTRUCTIONS
1. You will have your Xray today, I will only contact you if the results are abnormal.     2. Please schedule your physical therapy.      3. Please contact the clinic if pain persists at [phone]. Then we will order a lumbar MRI.

## 2018-10-29 NOTE — NURSING NOTE
"Kirby Houser is a 23 year old female who presents for:  Chief Complaint   Patient presents with     Neurologic Problem     Low back pain no PT/INJ/MRI, chiro care         Vitals:    Vitals:    10/29/18 1030   BP: 117/81   BP Location: Right arm   Patient Position: Sitting   Cuff Size: Adult Regular   Pulse: 77   Weight: 210 lb (95.3 kg)   Height: 5' 4\" (1.626 m)       BMI:  Estimated body mass index is 36.05 kg/(m^2) as calculated from the following:    Height as of this encounter: 5' 4\" (1.626 m).    Weight as of this encounter: 210 lb (95.3 kg).    Pain Score:  Mild Pain (2)      Do you feel safe in your environment?  Yes      Aileen Camacho    "

## 2018-10-29 NOTE — LETTER
10/29/2018         RE: Kirby Houser  [address]  [place] [address]        Dear Colleague,    Thank you for referring your patient, Kirby Houesr, to the [facility]. Please see a copy of my visit note below.    Dr. Wilton Candelaria  [facility]  Neurosurgery Clinic Visit        CC: low back pain     Primary care Provider: No Ref-Primary, Physician      Reason For Visit:   Low back pain       HPI: Kirby Houser is a 23 year old female with lumbar pain.  She notes that this has been coming on more often in the past year. She states that she gets intense pain in the back and states that she is immobile. She states that she does not know what causes it but it can be severe.  When it does come on it will take up to a week of self care to get it to calm down.  She denies any radicular pain or weakness.  She not currently working at this time.        Pain at its worst 10  Pain right now:  2    Past Medical History:   Diagnosis Date     Ovarian cyst, right      Uncomplicated asthma 2006       Past Medical History reviewed with patient during visit.    Past Surgical History:   Procedure Laterality Date     ABDOMEN SURGERY  2001    lymph node removal     Past Surgical History reviewed with patient during visit.    Current Outpatient Prescriptions   Medication     albuterol (PROAIR HFA, PROVENTIL HFA, VENTOLIN HFA) 108 (90 BASE) MCG/ACT inhaler     cyclobenzaprine (FLEXERIL) 10 MG tablet     ibuprofen (ADVIL/MOTRIN) 800 MG tablet     norgestimate-ethinyl estradiol (ORTHO-CYCLEN, 28,) 0.25-35 MG-MCG per tablet     norgestrel-ethinyl estradiol (LO/OVRAL) 0.3-30 MG-MCG per tablet     No current facility-administered medications for this visit.        No Known Allergies    Social History     Social History     Marital status: Single     Spouse name: N/A     Number of children: N/A     Years of education: N/A     Social History Main Topics     Smoking status: Never  "Smoker     Smokeless tobacco: Never Used     Alcohol use Yes     Drug use: No     Sexual activity: Yes     Partners: Male     Birth control/ protection: Pill     Other Topics Concern     None     Social History Narrative       Family History   Problem Relation Age of Onset     Adopted: Yes     Family history unknown: Yes         Review Of Systems  Skin: negative  Eyes: negative  Ears/Nose/Throat: negative  Respiratory: No shortness of breath, dyspnea on exertion, cough, or hemoptysis  Cardiovascular: negative  Gastrointestinal: negative  Genitourinary: negative  Musculoskeletal: back pain  Neurologic: negative  Psychiatric: negative  Hematologic/Lymphatic/Immunologic: negative  Endocrine: negative     ROS: 10 point ROS neg other than the symptoms noted above in the HPI.      Vital Signs: /81 (BP Location: Right arm, Patient Position: Sitting, Cuff Size: Adult Regular)  Pulse 77  Ht 5' 4\" (1.626 m)  Wt 210 lb (95.3 kg)  LMP 10/28/2018 (Exact Date)  Breastfeeding? No  BMI 36.05 kg/m2    Examination:  Constitutional:  Alert, well nourished, NAD.  Memory: recent and remote memory intact  HEENT: Normocephalic, atraumatic.   Pulm:  Without shortness of breath   CV:  No pitting edema of BLE.    Neurological:  Awake  Alert  Oriented x 3  Speech clear  Cranial nerves II - XII intact  PERRL  EOMI  Face symmetric  Tongue midline  Motor exam   Shoulder Abduction:  Right:  5/5   Left:  5/5  Biceps:                      Right:  5/5   Left:  5/5  Triceps:                     Right:  5/5   Left:  5/5  Wrist Extensors:       Right:  5/5   Left:  5/5  Wrist Flexors:           Right:  5/5   Left:  5/5  Intrinsics:                   Right:  5/5   Left:  5/5   Hip Flexor:                Right: 5/5  Left:  5/5  Hip Adductor:             Right:  5/5  Left:  5/5  Hip Abductor:             Right:  5/5  Left:  5/5  Gastroc Soleus:        Right:  5/5  Left:  5/5  Tib/Ant:                      Right:  5/5  Left:  5/5  EHL:          "                 Right:  5/5  Left:  5/5   Sensation normal to bilateral upper and lower extremities  Muscle tone to bilateral upper and lower extremities normal   Gait: Able to stand from a seated position. Normal non-antalgic, non-myelopathic gait.  Able to heel/toe walk without loss of balance    Lumbar examination reveals no tenderness of the spine or paraspinous muscles.  Hip height is symmetrical. Negative SI joint, sciatic notch or greater trochanteric tenderness to palpation bilaterally.  Straight leg raise is negative bilaterally.        Imaging: NONE      Assessment/Plan:    Kirby Houser is a 23 year old female with lumbar pain.  She notes that this has been coming on more often in the past year. She states that she gets intense pain in the back and states that she is immobile. She states that she does not know what causes it but it can be severe.  When it does come on it will take up to a week of self care to get it to calm down.  She denies any radicular pain or weakness.  She not currently working at this time.    She has not had PT or injections for her pain.  The pt is neurologically intact without pain today. It was explained that we will start with PT and xray.  She is open to this.        Patient Instructions   1. You will have your Xray today, I will only contact you if the results are abnormal.     2. Please schedule your physical therapy.      3. Please contact the clinic if pain persists at [phone]. Then we will order a lumbar MRI.          Alexandra Osorio CNP  [facility]  [address] [address]    Tel [phone]  Pager [phone]      Again, thank you for allowing me to participate in the care of your patient.        Sincerely,        ASHU Duran CNP

## 2018-10-29 NOTE — MR AVS SNAPSHOT
After Visit Summary   10/29/2018    Kirby Houser    MRN: [id number]           Patient Information     Date Of Birth          1995        Visit Information        Provider Department      10/29/2018 10:20 AM Alexandra Osorio APRN CNP [facility]        Today's Diagnoses     Lumbar spine pain    -  1      Care Instructions    1. You will have your Xray today, I will only contact you if the results are abnormal.     2. Please schedule your physical therapy.      3. Please contact the clinic if pain persists at [phone]. Then we will order a lumbar MRI.           Follow-ups after your visit        Additional Services     [facility] PT, HAND, AND CHIROPRACTIC REFERRAL       Physical Therapy, Hand Therapy and Chiropractic Care are available through:  *[facility]  *Hand Therapy (Occupational Therapy or Physical Therapy)  *[facility]    Call one number to schedule at any of the above locations: [phone].    Physical therapy, Hand therapy and/or Chiropractic care has been recommended by your physician as an excellent treatment option to reduce pain and help people return to normal activities, including sports.  Therapy and/or chiropractic care services are a great complement or alternative to expensive and invasive surgery, injections, or long-term use of prescription medications. The primary goal is to identify the underlying problem and provide you the tools to manage your condition on your own.     Please be aware that coverage of these services is subject to the terms and limitations of your health insurance plan.  Call member services at your health plan with any benefit or coverage questions.      Please bring the following to your appointment:  *Your personal calendar for scheduling future appointments  *Comfortable clothing                  Future tests that were ordered for you today     Open Future Orders        Priority  "Expected Expires Ordered    [facility] PT, HAND, AND CHIROPRACTIC REFERRAL Routine  10/29/2019 10/29/2018    XR Lumbar Bending Only 2/3 Views Routine 10/29/2018 10/29/2019 10/29/2018            Who to contact     If you have questions or need follow up information about today's clinic visit or your schedule please contact [facility] directly at [phone].  Normal or non-critical lab and imaging results will be communicated to you by [employer], letter or phone within 4 business days after the clinic has received the results. If you do not hear from us within 7 days, please contact the clinic through [employer] or phone. If you have a critical or abnormal lab result, we will notify you by phone as soon as possible.  Submit refill requests through [employer] or call your pharmacy and they will forward the refill request to us. Please allow 3 business days for your refill to be completed.          Additional Information About Your Visit        [employer] Information     [employer] gives you secure access to your electronic health record. If you see a primary care provider, you can also send messages to your care team and make appointments. If you have questions, please call your primary care clinic.  If you do not have a primary care provider, please call [phone] and they will assist you.        Care EveryWhere ID     This is your Care EveryWhere ID. This could be used by other organizations to access your [facility] records  [id number]        Your Vitals Were     Pulse Height Last Period Breastfeeding? BMI (Body Mass Index)       77 5' 4\" (1.626 m) 10/28/2018 (Exact Date) No 36.05 kg/m2        Blood Pressure from Last 3 Encounters:   10/29/18 117/81   09/20/18 110/86   06/19/18 122/80    Weight from Last 3 Encounters:   10/29/18 210 lb (95.3 kg)   09/20/18 209 lb 9.6 oz (95.1 kg)   06/19/18 201 lb (91.2 kg)               Primary Care Provider Fax #    Physician No Ref-Primary [phone]       No " address on file        Equal Access to Services     [facility] MARIA C : Hadii aad ku samara Silveira, waemile luqedmond, qahollis berg priyaantoninomi, carmina charly rodrigueschavogold harvey. So [facility] [phone].    ATENCIÓN: Si habla español, tiene a shannon disposición servicios gratuitos de asistencia lingüística. Medinaame al [phone].    We comply with applicable federal civil rights laws and [place] laws. We do not discriminate on the basis of race, color, national origin, age, disability, sex, sexual orientation, or gender identity.            Thank you!     Thank you for choosing [facility]  for your care. Our goal is always to provide you with excellent care. Hearing back from our patients is one way we can continue to improve our services. Please take a few minutes to complete the written survey that you may receive in the mail after your visit with us. Thank you!             Your Updated Medication List - Protect others around you: Learn how to safely use, store and throw away your medicines at www.disposemymeds.org.          This list is accurate as of 10/29/18 10:39 AM.  Always use your most recent med list.                   Brand Name Dispense Instructions for use Diagnosis    albuterol 108 (90 Base) MCG/ACT inhaler    PROAIR HFA/PROVENTIL HFA/VENTOLIN HFA     Inhale 2 puffs into the lungs as needed for shortness of breath / dyspnea or wheezing        cyclobenzaprine 10 MG tablet    FLEXERIL          ibuprofen 800 MG tablet    ADVIL/MOTRIN    30 tablet    Take 1 tablet (800 mg) by mouth every 8 hours as needed for moderate pain    Atypical chest pain       norgestrel-ethinyl estradiol 0.3-30 MG-MCG per tablet    LO/OVRAL    28 tablet    Take 1 tablet by mouth daily    Encounter for birth control pills maintenance       ORTHO-CYCLEN (28) 0.25-35 MG-MCG per tablet   Generic drug:  norgestimate-ethinyl estradiol      Take 1 tablet by mouth daily    Atypical chest pain, Painful respiration

## 2018-10-29 NOTE — PROGRESS NOTES
Dr. Wilton Candelaria  [facility]  Neurosurgery Clinic Visit        CC: low back pain     Primary care Provider: No Ref-Primary, Physician      Reason For Visit:   Low back pain       HPI: Kirby Houser is a 23 year old female with lumbar pain.  She notes that this has been coming on more often in the past year. She states that she gets intense pain in the back and states that she is immobile. She states that she does not know what causes it but it can be severe.  When it does come on it will take up to a week of self care to get it to calm down.  She denies any radicular pain or weakness.  She not currently working at this time.        Pain at its worst 10  Pain right now:  2    Past Medical History:   Diagnosis Date     Ovarian cyst, right      Uncomplicated asthma 2006       Past Medical History reviewed with patient during visit.    Past Surgical History:   Procedure Laterality Date     ABDOMEN SURGERY  2001    lymph node removal     Past Surgical History reviewed with patient during visit.    Current Outpatient Prescriptions   Medication     albuterol (PROAIR HFA, PROVENTIL HFA, VENTOLIN HFA) 108 (90 BASE) MCG/ACT inhaler     cyclobenzaprine (FLEXERIL) 10 MG tablet     ibuprofen (ADVIL/MOTRIN) 800 MG tablet     norgestimate-ethinyl estradiol (ORTHO-CYCLEN, 28,) 0.25-35 MG-MCG per tablet     norgestrel-ethinyl estradiol (LO/OVRAL) 0.3-30 MG-MCG per tablet     No current facility-administered medications for this visit.        No Known Allergies    Social History     Social History     Marital status: Single     Spouse name: N/A     Number of children: N/A     Years of education: N/A     Social History Main Topics     Smoking status: Never Smoker     Smokeless tobacco: Never Used     Alcohol use Yes     Drug use: No     Sexual activity: Yes     Partners: Male     Birth control/ protection: Pill     Other Topics Concern     None     Social History Narrative       Family History   Problem  "Relation Age of Onset     Adopted: Yes     Family history unknown: Yes         Review Of Systems  Skin: negative  Eyes: negative  Ears/Nose/Throat: negative  Respiratory: No shortness of breath, dyspnea on exertion, cough, or hemoptysis  Cardiovascular: negative  Gastrointestinal: negative  Genitourinary: negative  Musculoskeletal: back pain  Neurologic: negative  Psychiatric: negative  Hematologic/Lymphatic/Immunologic: negative  Endocrine: negative     ROS: 10 point ROS neg other than the symptoms noted above in the HPI.      Vital Signs: /81 (BP Location: Right arm, Patient Position: Sitting, Cuff Size: Adult Regular)  Pulse 77  Ht 5' 4\" (1.626 m)  Wt 210 lb (95.3 kg)  LMP 10/28/2018 (Exact Date)  Breastfeeding? No  BMI 36.05 kg/m2    Examination:  Constitutional:  Alert, well nourished, NAD.  Memory: recent and remote memory intact  HEENT: Normocephalic, atraumatic.   Pulm:  Without shortness of breath   CV:  No pitting edema of BLE.    Neurological:  Awake  Alert  Oriented x 3  Speech clear  Cranial nerves II - XII intact  PERRL  EOMI  Face symmetric  Tongue midline  Motor exam   Shoulder Abduction:  Right:  5/5   Left:  5/5  Biceps:                      Right:  5/5   Left:  5/5  Triceps:                     Right:  5/5   Left:  5/5  Wrist Extensors:       Right:  5/5   Left:  5/5  Wrist Flexors:           Right:  5/5   Left:  5/5  Intrinsics:                   Right:  5/5   Left:  5/5   Hip Flexor:                Right: 5/5  Left:  5/5  Hip Adductor:             Right:  5/5  Left:  5/5  Hip Abductor:             Right:  5/5  Left:  5/5  Gastroc Soleus:        Right:  5/5  Left:  5/5  Tib/Ant:                      Right:  5/5  Left:  5/5  EHL:                          Right:  5/5  Left:  5/5   Sensation normal to bilateral upper and lower extremities  Muscle tone to bilateral upper and lower extremities normal   Gait: Able to stand from a seated position. Normal non-antalgic, non-myelopathic " gait.  Able to heel/toe walk without loss of balance    Lumbar examination reveals no tenderness of the spine or paraspinous muscles.  Hip height is symmetrical. Negative SI joint, sciatic notch or greater trochanteric tenderness to palpation bilaterally.  Straight leg raise is negative bilaterally.        Imaging: NONE      Assessment/Plan:    Kirby Houser is a 23 year old female with lumbar pain.  She notes that this has been coming on more often in the past year. She states that she gets intense pain in the back and states that she is immobile. She states that she does not know what causes it but it can be severe.  When it does come on it will take up to a week of self care to get it to calm down.  She denies any radicular pain or weakness.  She not currently working at this time.    She has not had PT or injections for her pain.  The pt is neurologically intact without pain today. It was explained that we will start with PT and xray.  She is open to this.        Patient Instructions   1. You will have your Xray today, I will only contact you if the results are abnormal.     2. Please schedule your physical therapy.      3. Please contact the clinic if pain persists at [phone]. Then we will order a lumbar MRI.          Alexandra Osorio [facility]  [facility]  [address] [address]    Tel [phone]  Pager [phone]

## 2018-11-05 ENCOUNTER — THERAPY VISIT (OUTPATIENT)
Dept: PHYSICAL THERAPY | Facility: CLINIC | Age: 23
End: 2018-11-05
Attending: NURSE PRACTITIONER
Payer: COMMERCIAL

## 2018-11-05 DIAGNOSIS — M54.50 LUMBAR SPINE PAIN: ICD-10-CM

## 2018-11-05 PROCEDURE — 97161 PT EVAL LOW COMPLEX 20 MIN: CPT | Mod: GP | Performed by: PHYSICAL THERAPIST

## 2018-11-05 PROCEDURE — 97112 NEUROMUSCULAR REEDUCATION: CPT | Mod: GP | Performed by: PHYSICAL THERAPIST

## 2018-11-05 NOTE — PROGRESS NOTES
[facility] Initial Evaluation  Subjective:  Patient is a 23 year old female presenting with rehab back hpi. The history is provided by the patient. No  was used.   Kirby Houser is a 23 year old female with a lumbar condition.  Condition occurred with:  Insidious onset.    This is a chronic and recurrent condition  Pt c/o low back with 10 year history. In the last year pain intensity and frequency of exacerbations have increased. Pt had one exacerbation in August and another in October. Pt played volleyball in high school and remembered having some pain then, but did not remember an incident of injury. Principal area of pain at LS junction. When laying in bed, feels pain on left side down into buttocks and posterior thigh in bed in any position.  Sometimes worse with bending forward and backward, and more jarring activities like a trampoline, but has not found a definitive trigger.  Last flare-up was a month ago, now mostly better. Usually in pain for about a week. Been seeing a chiropractor for over 2 months, seems to be helping in the periods between flareups. Saw a PT a couple times a few years ago, seemed to help: gave advice about sleeping on back rather than stomach. So far that hasn't helped with more recent flareups. Pain is no longer constant. 1 flareup in October and 1 in August. Getting closer together. Goal: prevention of future flareups. Pt feels she generally knows what she needs to do: strengthening core and back, losing weight, etc. But still had pain when younger and weighed less, so she feels there is more to be done.  .    Patient reports pain:  Lower lumbar spine, lumbar spine left and lumbar spine right.  Radiates to:  Gluteals left and thigh left.  Pain is described as aching and sharp (During flareup, most movements cause it to be sharp, typically dull and achy. ) and is intermittent Pain Scale: 0/10--at time of eval.     Symptoms are exacerbated by  "bending (Used to have trouble sitting but not lately. Used to have pain down left side with back extension, but not lately.) and relieved by NSAID's, ice, muscle relaxants and rest (Prescribed muscle relaxer this last flareup, \"kind of helped.\" mostly rest helps. ).  Since onset symptoms are gradually improving.  Special tests:  X-ray (Hasn't heard findings yet).  Previous treatment includes chiropractic and physical therapy.  There was mild improvement following previous treatment.                                                Objective:  System         Lumbar/SI Evaluation  ROM:    AROM Lumbar:   Flexion:          Hands flat on floor, no pain  Ext:                    Mild pain at end range, WNL, stretching pain, unrelated to typical pain, upper lumbar vs lower--hinging at L4L5   Side Bend:        Left:  Fingertips past knee, no pain    Right:  Fingertips past knee, familier pain on L side at iliac crest  Rotation:           Left:     Right:   Side Glide:        Left:     Right:         Strength: Bienvenido Leg Lowering Test: 3+/5 (30 degrees from 90 loss of pelvic tilt).  Lumbar Myotomes:  not assessed (Absense of LE sx)            Lumbar DTR's:  not assessed (Absence of LE Sx)      Cord Signs:  not assessed (Absence of LE sx)    Lumbar Dermtomes:  not assessed (Absence of LE sx)                        Spinal Segmental Conclusions: General Hypermobility: 5/9 on Breighton Hypermobility Scale   Spring-testing positive for pain in L3, positive modified prone instability test at L3 (no pain with modified test, pt lifting upper body in prone)                                            Hip Evaluation    Hip Strength:    Flexion:   Left: 5/5   Pain:  Right: 5/5   Pain:                    Extension:  Left: 5/5  Pain:Right: 5/5    Pain:        Internal Rotation:  Left: 5/5    Pain:Right: 5/5   Pain:  External Rotation:  Left: 5-/5   Pain:  Right: 5-/5   Pain:                           General     ROS    Assessment/Plan:  "   Patient is a 23 year old female with lumbar complaints.    Patient has the following significant findings with corresponding treatment plan.                Diagnosis 1:  Low Back Pain  Pain -  hot/cold therapy, manual therapy, self management, education and home program  Decreased strength - therapeutic exercise, therapeutic activities and home program  Impaired muscle performance - neuro re-education and home program  Instability -  Therapeutic Activity  Therapeutic Exercise  Neuromuscular Re-education  home program    Therapy Evaluation Codes:   1) History comprised of:   Personal factors that impact the plan of care:      None.    Comorbidity factors that impact the plan of care are:      Overweight.     Medications impacting care: None.  2) Examination of Body Systems comprised of:   Body structures and functions that impact the plan of care:      Lumbar spine.   Activity limitations that impact the plan of care are:      Lifting and Sitting.  3) Clinical presentation characteristics are:   Stable/Uncomplicated.  4) Decision-Making    Low complexity using standardized patient assessment instrument and/or measureable assessment of functional outcome.  Cumulative Therapy Evaluation is: Low complexity.    Previous and current functional limitations:  (See Goal Flow Sheet for this information)    Short term and Long term goals: (See Goal Flow Sheet for this information)     Communication ability:  Patient appears to be able to clearly communicate and understand verbal and written communication and follow directions correctly.  Treatment Explanation - The following has been discussed with the patient:   RX ordered/plan of care  Anticipated outcomes  Possible risks and side effects  This patient would benefit from PT intervention to resume normal activities.   Rehab potential is excellent.    Frequency:  Every other week, once daily  Duration:  for 3 months  Discharge Plan:  Achieve all LTG.  Independent in home  treatment program.  Reach maximal therapeutic benefit.    Please refer to the daily flowsheet for treatment today, total treatment time and time spent performing 1:1 timed codes.

## 2018-11-05 NOTE — MR AVS SNAPSHOT
After Visit Summary   11/5/2018    Kirby Houser    MRN: [id number]           Patient Information     Date Of Birth          1995        Visit Information        Provider Department      11/5/2018 11:30 AM Josey Renae PT [facility]        Today's Diagnoses     Lumbar spine pain           Follow-ups after your visit        Your next 10 appointments already scheduled     Nov 12, 2018  9:30 AM [facility] Spine with Josey Renae PT   [facility] ([facility]  )    [address] [id number] [phone]              Who to contact     If you have questions or need follow up information about today's clinic visit or your schedule please contact [facility] PHYSICAL THERAPY directly at [phone].  Normal or non-critical lab and imaging results will be communicated to you by MyChart, letter or phone within 4 business days after the clinic has received the results. If you do not hear from us within 7 days, please contact the clinic through imageloophart or phone. If you have a critical or abnormal lab result, we will notify you by phone as soon as possible.  Submit refill requests through [employer] or call your pharmacy and they will forward the refill request to us. Please allow 3 business days for your refill to be completed.          Additional Information About Your Visit        MyChart Information     [employer] gives you secure access to your electronic health record. If you see a primary care provider, you can also send messages to your care team and make appointments. If you have questions, please call your primary care clinic.  If you do not have a primary care provider, please call [phone] and they will assist you.        Care EveryWhere ID     This is your Care EveryWhere ID. This could be used by other organizations to  access your [place] medical records  [id number]        Your Vitals Were     Last Period                   10/28/2018 (Exact Date)            Blood Pressure from Last 3 Encounters:   10/29/18 117/81   09/20/18 110/86   06/19/18 122/80    Weight from Last 3 Encounters:   10/29/18 95.3 kg (210 lb)   09/20/18 95.1 kg (209 lb 9.6 oz)   06/19/18 91.2 kg (201 lb)              We Performed the Following     HC PT EVAL, LOW COMPLEXITY     DIA INITIAL EVAL REPORT     [facility] PT, HAND, AND CHIROPRACTIC REFERRAL     NEUROMUSCULAR RE-EDUCATION        Primary Care Provider Fax #    Physician No Ref-Primary [phone]       No address on file        Equal Access to Services     SON LOMBARDI : Alan Silveira, waemile brooks, qaybta kaalmada sania, carmina franklin . So [facility] [phone].    ATENCIÓN: Si habla español, tiene a shannon disposición servicios gratuitos de asistencia lingüística. Llame al [phone].    We comply with applicable federal civil rights laws and [place] laws. We do not discriminate on the basis of race, color, national origin, age, disability, sex, sexual orientation, or gender identity.            Thank you!     Thank you for choosing [facility] PH[facility]ICAL THERAP[facility]  for your care. Our goal is always to provide you with excellent care. Hearing back from our patients is one way we can continue to improve our services. Please take a few minutes to complete the written survey that you may receive in the mail after your visit with us. Thank you!             Your Updated Medication List - Protect others around you: Learn how to safely use, store and throw away your medicines at www.disposemymeds.org.          This list is accurate as of 11/5/18  2:27 PM.  Always use your most recent med list.                   Brand Name Dispense Instructions for use Diagnosis    albuterol 108 (90 Base) MCG/ACT inhaler    PROAIR HFA/PROVENTIL  HFA/VENTOLIN HFA     Inhale 2 puffs into the lungs as needed for shortness of breath / dyspnea or wheezing        cyclobenzaprine 10 MG tablet    FLEXERIL          ibuprofen 800 MG tablet    ADVIL/MOTRIN    30 tablet    Take 1 tablet (800 mg) by mouth every 8 hours as needed for moderate pain    Atypical chest pain       norgestrel-ethinyl estradiol 0.3-30 MG-MCG per tablet    LO/OVRAL    28 tablet    Take 1 tablet by mouth daily    Encounter for birth control pills maintenance       ORTHO-CYCLEN (28) 0.25-35 MG-MCG per tablet   Generic drug:  norgestimate-ethinyl estradiol      Take 1 tablet by mouth daily    Atypical chest pain, Painful respiration

## 2018-11-07 NOTE — PROGRESS NOTES
[facility] Initial Evaluation  Subjective:  Patient is a 23 year old female presenting with rehab left ankle/foot hpi.                                      Pertinent medical history includes:  Asthma, migraines/headaches and overweight.      Current medications:  Anti-inflammatory.      Primary job tasks include:  Lifting, prolonged standing and prolonged sitting (carrying, pushing/pulling).              Oswestry Score: 8 %                 Objective:  System    Physical Exam    General     ROS    Assessment/Plan:

## 2018-11-12 ENCOUNTER — THERAPY VISIT (OUTPATIENT)
Dept: PHYSICAL THERAPY | Facility: CLINIC | Age: 23
End: 2018-11-12
Payer: COMMERCIAL

## 2018-11-12 DIAGNOSIS — M54.50 LUMBAR SPINE PAIN: ICD-10-CM

## 2018-11-12 PROCEDURE — 97110 THERAPEUTIC EXERCISES: CPT | Mod: GP | Performed by: PHYSICAL THERAPIST

## 2018-11-12 PROCEDURE — 97112 NEUROMUSCULAR REEDUCATION: CPT | Mod: GP | Performed by: PHYSICAL THERAPIST

## 2018-11-12 NOTE — MR AVS SNAPSHOT
After Visit Summary   11/12/2018    Kirby Houser    MRN: [id number]           Patient Information     Date Of Birth          1995        Visit Information        Provider Department      11/12/2018 9:30 AM Josey Renae PT [facility]        Today's Diagnoses     Lumbar spine pain           Follow-ups after your visit        Your next 10 appointments already scheduled     Nov 19, 2018 10:10 AM [facility] Spine with Josey Renae PT   [facility]  [facility]    [address] [id number] [phone]              Who to contact     If you have questions or need follow up information about today's clinic visit or your schedule please contact [facility] PHYSICAL THERAPY directly at [phone].  Normal or non-critical lab and imaging results will be communicated to you by MyChart, letter or phone within 4 business days after the clinic has received the results. If you do not hear from us within 7 days, please contact the clinic through [employer] or phone. If you have a critical or abnormal lab result, we will notify you by phone as soon as possible.  Submit refill requests through [employer] or call your pharmacy and they will forward the refill request to us. Please allow 3 business days for your refill to be completed.          Additional Information About Your Visit        MyChart Information     [employer] gives you secure access to your electronic health record. If you see a primary care provider, you can also send messages to your care team and make appointments. If you have questions, please call your primary care clinic.  If you do not have a primary care provider, please call [phone] and they will assist you.        Care EveryWhere ID     This is your Care EveryWhere ID. This could be used by other organizations to  access your [place] medical records  [id number]        Your Vitals Were     Last Period                   10/28/2018 (Exact Date)            Blood Pressure from Last 3 Encounters:   10/29/18 117/81   09/20/18 110/86   06/19/18 122/80    Weight from Last 3 Encounters:   10/29/18 95.3 kg (210 lb)   09/20/18 95.1 kg (209 lb 9.6 oz)   06/19/18 91.2 kg (201 lb)              We Performed the Following     Neuromuscular Re-Education     Therapeutic Exercises        Primary Care Provider Fax #    Physician No Ref-Primary [phone]       No address on file        Equal Access to Services     [facility] Center[facility] : Hadii reid Silveira, waemile brooks, syl kaalmami lui, carmina franklin . So [facility] [phone].    ATENCIÓN: Si habla español, tiene a shannon disposición servicios gratuitos de asistencia lingüística. Llame al [phone].    We comply with applicable federal civil rights laws and [place] laws. We do not discriminate on the basis of race, color, national origin, age, disability, sex, sexual orientation, or gender identity.            Thank you!     Thank you for choosing [facility] PHYSICAL THERAPY  for your care. Our goal is always to provide you with excellent care. Hearing back from our patients is one way we can continue to improve our services. Please take a few minutes to complete the written survey that you may receive in the mail after your visit with us. Thank you!             Your Updated Medication List - Protect others around you: Learn how to safely use, store and throw away your medicines at www.disposemymeds.org.          This list is accurate as of 11/12/18  1:34 PM.  Always use your most recent med list.                   Brand Name Dispense Instructions for use Diagnosis    albuterol 108 (90 Base) MCG/ACT inhaler    PROAIR HFA/PROVENTIL HFA/VENTOLIN HFA     Inhale 2 puffs into the lungs as needed for shortness of breath /  dyspnea or wheezing        cyclobenzaprine 10 MG tablet    FLEXERIL          ibuprofen 800 MG tablet    ADVIL/MOTRIN    30 tablet    Take 1 tablet (800 mg) by mouth every 8 hours as needed for moderate pain    Atypical chest pain       norgestrel-ethinyl estradiol 0.3-30 MG-MCG per tablet    LO/OVRAL    28 tablet    Take 1 tablet by mouth daily    Encounter for birth control pills maintenance       ORTHO-CYCLEN (28) 0.25-35 MG-MCG per tablet   Generic drug:  norgestimate-ethinyl estradiol      Take 1 tablet by mouth daily    Atypical chest pain, Painful respiration

## 2018-11-29 ENCOUNTER — OFFICE VISIT (OUTPATIENT)
Dept: INTERNAL MEDICINE | Facility: CLINIC | Age: 23
End: 2018-11-29
Payer: COMMERCIAL

## 2018-11-29 VITALS
BODY MASS INDEX: 36.22 KG/M2 | OXYGEN SATURATION: 98 % | HEART RATE: 89 BPM | DIASTOLIC BLOOD PRESSURE: 70 MMHG | WEIGHT: 211 LBS | TEMPERATURE: 98.1 F | SYSTOLIC BLOOD PRESSURE: 120 MMHG

## 2018-11-29 DIAGNOSIS — Z23 NEED FOR PROPHYLACTIC VACCINATION AND INOCULATION AGAINST INFLUENZA: ICD-10-CM

## 2018-11-29 DIAGNOSIS — R10.31 RLQ ABDOMINAL PAIN: Primary | ICD-10-CM

## 2018-11-29 DIAGNOSIS — Z11.4 SCREENING FOR HIV (HUMAN IMMUNODEFICIENCY VIRUS): ICD-10-CM

## 2018-11-29 LAB
ALBUMIN SERPL-MCNC: 3.7 G/DL (ref 3.4–5)
ALBUMIN UR-MCNC: NEGATIVE MG/DL
ALP SERPL-CCNC: 70 U/L (ref 40–150)
ALT SERPL W P-5'-P-CCNC: 19 U/L (ref 0–50)
ANION GAP SERPL CALCULATED.3IONS-SCNC: 4 MMOL/L (ref 3–14)
APPEARANCE UR: CLEAR
AST SERPL W P-5'-P-CCNC: 19 U/L (ref 0–45)
BACTERIA #/AREA URNS HPF: ABNORMAL /HPF
BASOPHILS # BLD AUTO: 0 10E9/L (ref 0–0.2)
BASOPHILS NFR BLD AUTO: 0.4 %
BETA HCG QUAL IFA URINE: NEGATIVE
BILIRUB SERPL-MCNC: 0.3 MG/DL (ref 0.2–1.3)
BILIRUB UR QL STRIP: NEGATIVE
BUN SERPL-MCNC: 11 MG/DL (ref 7–30)
CALCIUM SERPL-MCNC: 8.9 MG/DL (ref 8.5–10.1)
CHLORIDE SERPL-SCNC: 109 MMOL/L (ref 94–109)
CO2 SERPL-SCNC: 27 MMOL/L (ref 20–32)
COLOR UR AUTO: YELLOW
CREAT SERPL-MCNC: 0.82 MG/DL (ref 0.52–1.04)
DIFFERENTIAL METHOD BLD: ABNORMAL
EOSINOPHIL # BLD AUTO: 0.1 10E9/L (ref 0–0.7)
EOSINOPHIL NFR BLD AUTO: 2 %
ERYTHROCYTE [DISTWIDTH] IN BLOOD BY AUTOMATED COUNT: 13 % (ref 10–15)
GFR SERPL CREATININE-BSD FRML MDRD: 85 ML/MIN/1.7M2
GLUCOSE SERPL-MCNC: 98 MG/DL (ref 70–99)
GLUCOSE UR STRIP-MCNC: NEGATIVE MG/DL
HCT VFR BLD AUTO: 43.3 % (ref 35–47)
HGB BLD-MCNC: 13.5 G/DL (ref 11.7–15.7)
HGB UR QL STRIP: ABNORMAL
HIV 1+2 AB+HIV1 P24 AG SERPL QL IA: NONREACTIVE
KETONES UR STRIP-MCNC: NEGATIVE MG/DL
LEUKOCYTE ESTERASE UR QL STRIP: NEGATIVE
LYMPHOCYTES # BLD AUTO: 1.7 10E9/L (ref 0.8–5.3)
LYMPHOCYTES NFR BLD AUTO: 29.9 %
MCH RBC QN AUTO: 29.3 PG (ref 26.5–33)
MCHC RBC AUTO-ENTMCNC: 31.2 G/DL (ref 31.5–36.5)
MCV RBC AUTO: 94 FL (ref 78–100)
MONOCYTES # BLD AUTO: 0.5 10E9/L (ref 0–1.3)
MONOCYTES NFR BLD AUTO: 8.4 %
MUCOUS THREADS #/AREA URNS LPF: PRESENT /LPF
NEUTROPHILS # BLD AUTO: 3.3 10E9/L (ref 1.6–8.3)
NEUTROPHILS NFR BLD AUTO: 59.3 %
NITRATE UR QL: NEGATIVE
NON-SQ EPI CELLS #/AREA URNS LPF: ABNORMAL /LPF
PH UR STRIP: 6 PH (ref 5–7)
PLATELET # BLD AUTO: 222 10E9/L (ref 150–450)
POTASSIUM SERPL-SCNC: 4.4 MMOL/L (ref 3.4–5.3)
PROT SERPL-MCNC: 7.3 G/DL (ref 6.8–8.8)
RBC # BLD AUTO: 4.6 10E12/L (ref 3.8–5.2)
RBC #/AREA URNS AUTO: ABNORMAL /HPF
SODIUM SERPL-SCNC: 140 MMOL/L (ref 133–144)
SOURCE: ABNORMAL
SP GR UR STRIP: 1.02 (ref 1–1.03)
UROBILINOGEN UR STRIP-ACNC: 0.2 EU/DL (ref 0.2–1)
WBC # BLD AUTO: 5.6 10E9/L (ref 4–11)
WBC #/AREA URNS AUTO: ABNORMAL /HPF

## 2018-11-29 PROCEDURE — 81001 URINALYSIS AUTO W/SCOPE: CPT | Performed by: PHYSICIAN ASSISTANT

## 2018-11-29 PROCEDURE — 90686 IIV4 VACC NO PRSV 0.5 ML IM: CPT | Performed by: PHYSICIAN ASSISTANT

## 2018-11-29 PROCEDURE — 90471 IMMUNIZATION ADMIN: CPT | Performed by: PHYSICIAN ASSISTANT

## 2018-11-29 PROCEDURE — 99214 OFFICE O/P EST MOD 30 MIN: CPT | Mod: 25 | Performed by: PHYSICIAN ASSISTANT

## 2018-11-29 PROCEDURE — 87389 HIV-1 AG W/HIV-1&-2 AB AG IA: CPT | Performed by: PHYSICIAN ASSISTANT

## 2018-11-29 PROCEDURE — 80053 COMPREHEN METABOLIC PANEL: CPT | Performed by: PHYSICIAN ASSISTANT

## 2018-11-29 PROCEDURE — 36415 COLL VENOUS BLD VENIPUNCTURE: CPT | Performed by: PHYSICIAN ASSISTANT

## 2018-11-29 PROCEDURE — 85025 COMPLETE CBC W/AUTO DIFF WBC: CPT | Performed by: PHYSICIAN ASSISTANT

## 2018-11-29 PROCEDURE — 84703 CHORIONIC GONADOTROPIN ASSAY: CPT | Performed by: PHYSICIAN ASSISTANT

## 2018-11-29 NOTE — PROGRESS NOTES
SUBJECTIVE:   Kirby Houser is a 23 year old female who presents to clinic today for the following health issues:    Abdominal Pain      Duration: about a week ago    Description (location/character/radiation): Sharp pain on lower right abdomen    Not very noticeable unless touch it or sit knees to chest    Tiny dull ache, doesn't bother me a lot   Pt denies fever, malaise, nausea, vomiting and urinary and and vaginal symptoms   Pain comes and goes   Sharp pain only with touching  Pain feels different from previous ovarian cyst pain        Associated flank pain: None    Intensity:  mild    Accompanying signs and symptoms:        Fever/Chills: no        Gas/Bloating: YES- But not sure       Some constipation, but no changes in her regular bowel habits        Nausea/vomitting: no        Diarrhea: no        Dysuria or Hematuria: no     History (previous similar pain/trauma/previous testing): no    Precipitating or alleviating factors:       Pain worse with eating/BM/urination: No       Pain relieved by BM: no     Therapies tried and outcome: None    LMP:  11/26/2018      Problem list and histories reviewed & adjusted, as indicated.  Additional history: as documented    Reviewed and updated as needed this visit by clinical staff  Tobacco  Allergies  Meds  Soc Hx      Reviewed and updated as needed this visit by Provider  Tobacco  Meds  Soc Hx          OBJECTIVE:     /70  Pulse 89  Temp 98.1  F (36.7  C) (Oral)  Wt 211 lb (95.7 kg)  LMP 11/26/2018  SpO2 98%  BMI 36.22 kg/m2  Body mass index is 36.22 kg/(m^2).  GENERAL: healthy, alert and no distress  RESP: lungs clear to auscultation - no rales, rhonchi or wheezes  CV: regular rates and rhythm, normal S1 S2, no S3 or S4 and no murmur, click or rub  ABDOMEN: soft,without hepatosplenomegaly or masses, tenderness is difficult to locate, patient has to point out tenderness with my general exam it was not felt, when pressing slightly right from midline  suprapubically there is tenderness with no guarding or rebound tenderness, and no tenderness with hip flexion or rotation and bowel sounds normal    ASSESSMENT/PLAN:       1. RLQ abdominal pain  - exam tenderness does to appear to be somewhat minimal this along with no other contributing symptoms, I do not feel there is an indication for further imaging   - lab work up as below, if labs point to further concern, will reconsider imaging  - CBC with platelets and differential  - Comprehensive metabolic panel  - Beta HCG Qual, Urine - [facility] and [place] ([id number])  - UA with Microscopic reflex to Culture  - reviewed when to follow up     2. Screening for HIV (human immunodeficiency virus)  - HIV Antigen Antibody Combo    3. Need for prophylactic vaccination and inoculation against influenza  - FLU VACCINE, SPLIT VIRUS, IM (QUADRIVALENT) [48287]- >3 YRS      Giana Pinto PA-C  [facility]

## 2018-11-29 NOTE — MR AVS SNAPSHOT
After Visit Summary   11/29/2018    Kirby Houser    MRN: [id number]           Patient Information     Date Of Birth          1995        Visit Information        Provider Department      11/29/2018 9:00 AM Giana Aguilar PA-C [facility]        Today's Diagnoses     RLQ abdominal pain    -  1    Screening for HIV (human immunodeficiency virus)           Follow-ups after your visit        Follow-up notes from your care team     Return in about 6 months (around 5/29/2019) for Physical Exam.      Who to contact     If you have questions or need follow up information about today's clinic visit or your schedule please contact [facility] directly at [phone].  Normal or non-critical lab and imaging results will be communicated to you by [employer], letter or phone within 4 business days after the clinic has received the results. If you do not hear from us within 7 days, please contact the clinic through [employer] or phone. If you have a critical or abnormal lab result, we will notify you by phone as soon as possible.  Submit refill requests through [employer] or call your pharmacy and they will forward the refill request to us. Please allow 3 business days for your refill to be completed.          Additional Information About Your Visit        MyChart Information     [employer] gives you secure access to your electronic health record. If you see a primary care provider, you can also send messages to your care team and make appointments. If you have questions, please call your primary care clinic.  If you do not have a primary care provider, please call [phone] and they will assist you.        Care EveryWhere ID     This is your Care EveryWhere ID. This could be used by other organizations to access your [place] medical records  [id number]        Your Vitals Were     Pulse Temperature Last Period Pulse Oximetry BMI (Body Mass Index)       89  98.1  F (36.7  C) (Oral) 11/26/2018 98% 36.22 kg/m2        Blood Pressure from Last 3 Encounters:   11/29/18 120/70   10/29/18 117/81   09/20/18 110/86    Weight from Last 3 Encounters:   11/29/18 211 lb (95.7 kg)   10/29/18 210 lb (95.3 kg)   09/20/18 209 lb 9.6 oz (95.1 kg)              We Performed the Following     Beta HCG Qual, Urine - [facility] and [place] ([id number])     CBC with platelets and differential     Comprehensive metabolic panel     HIV Antigen Antibody Combo     UA with Microscopic reflex to Culture          Today's Medication Changes          These changes are accurate as of 11/29/18  9:26 AM.  If you have any questions, ask your nurse or doctor.               Stop taking these medicines if you haven't already. Please contact your care team if you have questions.     ORTHO-CYCLEN (28) 0.25-35 MG-MCG tablet   Generic drug:  norgestimate-ethinyl estradiol   Stopped by:  Giana Aguilar PA-C                    Primary Care Provider Fax #    Physician No Ref-Primary [phone]       No address on file        Equal Access to Services     [facility] : Hadii reid pascual Soalayna, waaxda luqadaha, qaybta kaalmada adejarredyami, carmina franklin . So [facility] [phone].    ATENCIÓN: Si habla español, tiene a shannon disposición servicios gratuitos de asistencia lingüística. Llame al [phone].    We comply with applicable federal civil rights laws and [place] laws. We do not discriminate on the basis of race, color, national origin, age, disability, sex, sexual orientation, or gender identity.            Thank you!     Thank you for choosing [facility]  for your care. Our goal is always to provide you with excellent care. Hearing back from our patients is one way we can continue to improve our services. Please take a few minutes to complete the written survey that you may receive in the mail after your visit with us. Thank you!             Your  Updated Medication List - Protect others around you: Learn how to safely use, store and throw away your medicines at www.disposemymeds.org.          This list is accurate as of 11/29/18  9:26 AM.  Always use your most recent med list.                   Brand Name Dispense Instructions for use Diagnosis    albuterol 108 (90 Base) MCG/ACT inhaler    PROAIR HFA/PROVENTIL HFA/VENTOLIN HFA     Inhale 2 puffs into the lungs as needed for shortness of breath / dyspnea or wheezing        cyclobenzaprine 10 MG tablet    FLEXERIL          ibuprofen 800 MG tablet    ADVIL/MOTRIN    30 tablet    Take 1 tablet (800 mg) by mouth every 8 hours as needed for moderate pain    Atypical chest pain       norgestrel-ethinyl estradiol 0.3-30 MG-MCG tablet    LO/OVRAL    28 tablet    Take 1 tablet by mouth daily    Encounter for birth control pills maintenance

## 2019-03-18 PROBLEM — M54.50 LUMBAR SPINE PAIN: Status: RESOLVED | Noted: 2018-11-05 | Resolved: 2019-03-18

## 2019-03-18 NOTE — PROGRESS NOTES
Kirby Houser was seen 2 times for evaluation and treatment.  Patient did not return for further treatment and current status is unknown.  Due to short treatment duration, no objective or functional changes were made.  Please see goal flow sheet from episode noted date below and initial evaluation for further information.

## 2019-06-10 ENCOUNTER — OFFICE VISIT (OUTPATIENT)
Dept: URGENT CARE | Facility: URGENT CARE | Age: 24
End: 2019-06-10
Payer: COMMERCIAL

## 2019-06-10 VITALS
RESPIRATION RATE: 18 BRPM | OXYGEN SATURATION: 100 % | TEMPERATURE: 97.9 F | SYSTOLIC BLOOD PRESSURE: 100 MMHG | WEIGHT: 211 LBS | DIASTOLIC BLOOD PRESSURE: 70 MMHG | HEART RATE: 110 BPM | BODY MASS INDEX: 36.22 KG/M2

## 2019-06-10 DIAGNOSIS — R19.7 DIARRHEA, UNSPECIFIED TYPE: ICD-10-CM

## 2019-06-10 DIAGNOSIS — R11.2 NAUSEA AND VOMITING, INTRACTABILITY OF VOMITING NOT SPECIFIED, UNSPECIFIED VOMITING TYPE: Primary | ICD-10-CM

## 2019-06-10 DIAGNOSIS — R10.9 ABDOMINAL CRAMPS: ICD-10-CM

## 2019-06-10 PROCEDURE — 99214 OFFICE O/P EST MOD 30 MIN: CPT | Performed by: PHYSICIAN ASSISTANT

## 2019-06-10 RX ORDER — ONDANSETRON 4 MG/1
4 TABLET, ORALLY DISINTEGRATING ORAL EVERY 8 HOURS PRN
Qty: 12 TABLET | Refills: 0 | Status: SHIPPED | OUTPATIENT
Start: 2019-06-10 | End: 2019-06-26

## 2019-06-10 RX ORDER — DIPHENOXYLATE HCL/ATROPINE 2.5-.025MG
1 TABLET ORAL 3 TIMES DAILY PRN
Qty: 21 TABLET | Refills: 0 | Status: SHIPPED | OUTPATIENT
Start: 2019-06-10 | End: 2019-06-26

## 2019-06-10 NOTE — PROGRESS NOTES
SUBJECTIVE:  Chief Complaint   Patient presents with     Diarrhea     Pt states she has stomach cramping, diarrhea, dizzy, since 730 am      Abdominal Pain     Kirby Houser is a 24 year old female whose symptoms began this morning ago and include nausea, vomiting and diarrhea.    Symptoms are sudden onset and moderate.    Aggravating factors: eating and drinking cause vomiting.    Alleviating factors: rest  Associated symptoms:  Pain: abdominal   Fever: no fever  Diarrhea:  consists of few stools/day and is ongoing  Stools: watery  Appetite: decreased appetite  Risk factors: possible bad food    Past Medical History:   Diagnosis Date     Ovarian cyst, right      Uncomplicated asthma 2006     ALLERGIES  No Known Allergies     Family History   Adopted: Yes   Family history unknown: Yes         Social History     Tobacco Use     Smoking status: Never Smoker     Smokeless tobacco: Never Used   Substance Use Topics     Alcohol use: Yes       ROS:  CONSTITUTIONAL:NEGATIVE for fever, chills, change in weight  INTEGUMENTARY/SKIN: NEGATIVE for worrisome rashes, moles or lesions  ENT/MOUTH: NEGATIVE for ear, mouth and throat problems  RESP:NEGATIVE for significant cough or SOB  CV: NEGATIVE for chest pain, palpitations or peripheral edema  GI: POSITIVE for nausea, vomiting and diarrhea  : normal menstrual cycles  MUSCULOSKELETAL: NEGATIVE for significant arthralgias or myalgia  NEURO: NEGATIVE for weakness, dizziness or paresthesias    OBJECTIVE:  /70 (BP Location: Left arm, Patient Position: Sitting, Cuff Size: Adult Regular)   Pulse 110   Temp 97.9  F (36.6  C) (Oral)   Resp 18   Wt 95.7 kg (211 lb)   SpO2 100%   BMI 36.22 kg/m    GENERAL APPEARANCE: healthy, alert and no distress  EYES: EOMI,  PERRL, conjunctiva clear  HENT: ear canals and TM's normal.  Nose and mouth without ulcers, erythema or lesions  NECK: supple, nontender, no lymphadenopathy  RESP: lungs clear to auscultation - no rales, rhonchi or  wheezes  CV: regular rates and rhythm, normal S1 S2, no murmur noted  ABDOMEN: soft, normal bowel sounds, tenderness mild epigastric  NEURO: Normal strength and tone, sensory exam grossly normal,  normal speech and mentation  SKIN: no suspicious lesions or rashes    Labs reviewed:    Results for orders placed or performed in visit on 11/29/18   CBC with platelets and differential   Result Value Ref Range    WBC 5.6 4.0 - 11.0 10e9/L    RBC Count 4.60 3.8 - 5.2 10e12/L    Hemoglobin 13.5 11.7 - 15.7 g/dL    Hematocrit 43.3 35.0 - 47.0 %    MCV 94 78 - 100 fl    MCH 29.3 26.5 - 33.0 pg    MCHC 31.2 (L) 31.5 - 36.5 g/dL    RDW 13.0 10.0 - 15.0 %    Platelet Count 222 150 - 450 10e9/L    % Neutrophils 59.3 %    % Lymphocytes 29.9 %    % Monocytes 8.4 %    % Eosinophils 2.0 %    % Basophils 0.4 %    Absolute Neutrophil 3.3 1.6 - 8.3 10e9/L    Absolute Lymphocytes 1.7 0.8 - 5.3 10e9/L    Absolute Monocytes 0.5 0.0 - 1.3 10e9/L    Absolute Eosinophils 0.1 0.0 - 0.7 10e9/L    Absolute Basophils 0.0 0.0 - 0.2 10e9/L    Diff Method Automated Method    Comprehensive metabolic panel   Result Value Ref Range    Sodium 140 133 - 144 mmol/L    Potassium 4.4 3.4 - 5.3 mmol/L    Chloride 109 94 - 109 mmol/L    Carbon Dioxide 27 20 - 32 mmol/L    Anion Gap 4 3 - 14 mmol/L    Glucose 98 70 - 99 mg/dL    Urea Nitrogen 11 7 - 30 mg/dL    Creatinine 0.82 0.52 - 1.04 mg/dL    GFR Estimate 85 >60 mL/min/1.7m2    GFR Estimate If Black >90 >60 mL/min/1.7m2    Calcium 8.9 8.5 - 10.1 mg/dL    Bilirubin Total 0.3 0.2 - 1.3 mg/dL    Albumin 3.7 3.4 - 5.0 g/dL    Protein Total 7.3 6.8 - 8.8 g/dL    Alkaline Phosphatase 70 40 - 150 U/L    ALT 19 0 - 50 U/L    AST 19 0 - 45 U/L   Beta HCG Qual, Urine - [facility] and [place] ([id number])   Result Value Ref Range    Beta HCG Qual IFA Urine Negative NEG^Negative      UA with Microscopic reflex to Culture   Result Value Ref Range    Color Urine Yellow     Appearance Urine Clear     Glucose Urine Negative  NEG^Negative mg/dL    Bilirubin Urine Negative NEG^Negative    Ketones Urine Negative NEG^Negative mg/dL    Specific Gravity Urine 1.025 1.003 - 1.035    pH Urine 6.0 5.0 - 7.0 pH    Protein Albumin Urine Negative NEG^Negative mg/dL    Urobilinogen Urine 0.2 0.2 - 1.0 EU/dL    Nitrite Urine Negative NEG^Negative    Blood Urine Large (A) NEG^Negative    Leukocyte Esterase Urine Negative NEG^Negative    Source Midstream Urine     WBC Urine 0 - 5 OTO5^0 - 5 /HPF    RBC Urine 25-50 (A) OTO2^O - 2 /HPF    Squamous Epithelial /LPF Urine Moderate (A) FEW^Few /LPF    Bacteria Urine Moderate (A) NEG^Negative /HPF    Mucous Urine Present (A) NEG^Negative /LPF   HIV Antigen Antibody Combo   Result Value Ref Range    HIV Antigen Antibody Combo Nonreactive NR^Nonreactive           ASSESSMENT/PLAN      ICD-10-CM    1. Nausea and vomiting, intractability of vomiting not specified, unspecified vomiting type R11.2 ondansetron (ZOFRAN ODT) 4 MG ODT tab     ondansetron (ZOFRAN ODT) 4 MG ODT tab   2. Abdominal cramps R10.9 Enteric Bacteria and Virus Panel by [facility]OOKIE Stool     Clostridium difficile toxin B PCR     Ova and Parasite Exam Routine   3. Diarrhea, unspecified type R19.7 Enteric Bacteria and Virus Panel by [facility]OOKIE Stool     Clostridium difficile toxin B PCR     Ova and Parasite Exam Routine     diphenoxylate-atropine (LOMOTIL) 2.5-0.025 MG tablet       iet: small amounts clear fluids frequently, Pedialyte, dilute gatorade, BRAT diet and small amounts clear fluids frequently,soups,juices,water,advance diet as tolerated  Orders Placed This Encounter     ondansetron (ZOFRAN ODT) 4 MG ODT tab     ondansetron (ZOFRAN ODT) 4 MG ODT tab     diphenoxylate-atropine (LOMOTIL) 2.5-0.025 MG tablet       Stool cultures pending  Follow-up with PCP if not improving

## 2019-06-22 ENCOUNTER — NURSE TRIAGE (OUTPATIENT)
Dept: NURSING | Facility: CLINIC | Age: 24
End: 2019-06-22

## 2019-06-22 NOTE — TELEPHONE ENCOUNTER
Caller  Needs refill of OCP and it was  Refused; has been off  OCP foar one week    Review of EMR reveals patient is due for annual gyn exam  Caller understands and will see PCP this week  Call transferred  to   Advised to use alternative contraception   Zhane Parada RN  FNA    Reason for Disposition    Caller requesting a NON-URGENT new prescription or refill and triager unable to refill per unit policy    Protocols used: MEDICATION QUESTION CALL-A-AH
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
## 2019-06-26 ENCOUNTER — OFFICE VISIT (OUTPATIENT)
Dept: OBGYN | Facility: CLINIC | Age: 24
End: 2019-06-26
Payer: COMMERCIAL

## 2019-06-26 VITALS
HEART RATE: 88 BPM | DIASTOLIC BLOOD PRESSURE: 68 MMHG | SYSTOLIC BLOOD PRESSURE: 122 MMHG | WEIGHT: 209.8 LBS | BODY MASS INDEX: 37.17 KG/M2 | HEIGHT: 63 IN

## 2019-06-26 DIAGNOSIS — Z87.42 HISTORY OF OVARIAN CYST: ICD-10-CM

## 2019-06-26 DIAGNOSIS — H57.89: ICD-10-CM

## 2019-06-26 DIAGNOSIS — N94.10 DYSPAREUNIA, FEMALE: ICD-10-CM

## 2019-06-26 DIAGNOSIS — J45.990 EXERCISE-INDUCED ASTHMA: ICD-10-CM

## 2019-06-26 DIAGNOSIS — Z01.411 ENCOUNTER FOR GYNECOLOGICAL EXAMINATION WITH ABNORMAL FINDING: Primary | ICD-10-CM

## 2019-06-26 DIAGNOSIS — Z30.41 ENCOUNTER FOR BIRTH CONTROL PILLS MAINTENANCE: ICD-10-CM

## 2019-06-26 PROCEDURE — 99395 PREV VISIT EST AGE 18-39: CPT | Performed by: ADVANCED PRACTICE MIDWIFE

## 2019-06-26 PROCEDURE — 99212 OFFICE O/P EST SF 10 MIN: CPT | Mod: 25 | Performed by: ADVANCED PRACTICE MIDWIFE

## 2019-06-26 RX ORDER — ALBUTEROL SULFATE 90 UG/1
2 AEROSOL, METERED RESPIRATORY (INHALATION) PRN
Qty: 1 INHALER | Refills: 1 | Status: SHIPPED | OUTPATIENT
Start: 2019-06-26

## 2019-06-26 ASSESSMENT — ANXIETY QUESTIONNAIRES
GAD7 TOTAL SCORE: 3
1. FEELING NERVOUS, ANXIOUS, OR ON EDGE: MORE THAN HALF THE DAYS
6. BECOMING EASILY ANNOYED OR IRRITABLE: NOT AT ALL
2. NOT BEING ABLE TO STOP OR CONTROL WORRYING: NOT AT ALL
5. BEING SO RESTLESS THAT IT IS HARD TO SIT STILL: NOT AT ALL
7. FEELING AFRAID AS IF SOMETHING AWFUL MIGHT HAPPEN: NOT AT ALL
3. WORRYING TOO MUCH ABOUT DIFFERENT THINGS: SEVERAL DAYS

## 2019-06-26 ASSESSMENT — PATIENT HEALTH QUESTIONNAIRE - PHQ9
5. POOR APPETITE OR OVEREATING: NOT AT ALL
SUM OF ALL RESPONSES TO PHQ QUESTIONS 1-9: 3

## 2019-06-26 ASSESSMENT — MIFFLIN-ST. JEOR: SCORE: 1657.84

## 2019-06-27 ASSESSMENT — ANXIETY QUESTIONNAIRES: GAD7 TOTAL SCORE: 3

## 2019-07-01 ENCOUNTER — OFFICE VISIT (OUTPATIENT)
Dept: INTERNAL MEDICINE | Facility: CLINIC | Age: 24
End: 2019-07-01
Payer: COMMERCIAL

## 2019-07-01 VITALS
OXYGEN SATURATION: 99 % | BODY MASS INDEX: 38.27 KG/M2 | RESPIRATION RATE: 16 BRPM | TEMPERATURE: 98.6 F | SYSTOLIC BLOOD PRESSURE: 112 MMHG | HEART RATE: 80 BPM | DIASTOLIC BLOOD PRESSURE: 70 MMHG | WEIGHT: 212.6 LBS

## 2019-07-01 DIAGNOSIS — H53.8 BLURRED VISION: ICD-10-CM

## 2019-07-01 DIAGNOSIS — H57.89 IRRITATION OF BOTH EYES: Primary | ICD-10-CM

## 2019-07-01 LAB
ALBUMIN SERPL-MCNC: 3.7 G/DL (ref 3.4–5)
ALP SERPL-CCNC: 79 U/L (ref 40–150)
ALT SERPL W P-5'-P-CCNC: 31 U/L (ref 0–50)
ANION GAP SERPL CALCULATED.3IONS-SCNC: 7 MMOL/L (ref 3–14)
AST SERPL W P-5'-P-CCNC: 22 U/L (ref 0–45)
BASOPHILS # BLD AUTO: 0 10E9/L (ref 0–0.2)
BASOPHILS NFR BLD AUTO: 0.2 %
BILIRUB SERPL-MCNC: 0.3 MG/DL (ref 0.2–1.3)
BUN SERPL-MCNC: 11 MG/DL (ref 7–30)
CALCIUM SERPL-MCNC: 8.7 MG/DL (ref 8.5–10.1)
CHLORIDE SERPL-SCNC: 109 MMOL/L (ref 94–109)
CO2 SERPL-SCNC: 24 MMOL/L (ref 20–32)
CREAT SERPL-MCNC: 0.77 MG/DL (ref 0.52–1.04)
DIFFERENTIAL METHOD BLD: NORMAL
EOSINOPHIL # BLD AUTO: 0.2 10E9/L (ref 0–0.7)
EOSINOPHIL NFR BLD AUTO: 1.8 %
ERYTHROCYTE [DISTWIDTH] IN BLOOD BY AUTOMATED COUNT: 12.7 % (ref 10–15)
ERYTHROCYTE [SEDIMENTATION RATE] IN BLOOD BY WESTERGREN METHOD: 5 MM/H (ref 0–20)
GFR SERPL CREATININE-BSD FRML MDRD: >90 ML/MIN/{1.73_M2}
GLUCOSE SERPL-MCNC: 95 MG/DL (ref 70–99)
HCT VFR BLD AUTO: 41.2 % (ref 35–47)
HGB BLD-MCNC: 13.5 G/DL (ref 11.7–15.7)
LYMPHOCYTES # BLD AUTO: 2.1 10E9/L (ref 0.8–5.3)
LYMPHOCYTES NFR BLD AUTO: 25.8 %
MCH RBC QN AUTO: 29.5 PG (ref 26.5–33)
MCHC RBC AUTO-ENTMCNC: 32.8 G/DL (ref 31.5–36.5)
MCV RBC AUTO: 90 FL (ref 78–100)
MONOCYTES # BLD AUTO: 0.5 10E9/L (ref 0–1.3)
MONOCYTES NFR BLD AUTO: 6.5 %
NEUTROPHILS # BLD AUTO: 5.4 10E9/L (ref 1.6–8.3)
NEUTROPHILS NFR BLD AUTO: 65.7 %
PLATELET # BLD AUTO: 209 10E9/L (ref 150–450)
POTASSIUM SERPL-SCNC: 4.1 MMOL/L (ref 3.4–5.3)
PROT SERPL-MCNC: 7.1 G/DL (ref 6.8–8.8)
RBC # BLD AUTO: 4.57 10E12/L (ref 3.8–5.2)
SODIUM SERPL-SCNC: 140 MMOL/L (ref 133–144)
TSH SERPL DL<=0.005 MIU/L-ACNC: 1.98 MU/L (ref 0.4–4)
WBC # BLD AUTO: 8.3 10E9/L (ref 4–11)

## 2019-07-01 PROCEDURE — 85025 COMPLETE CBC W/AUTO DIFF WBC: CPT | Performed by: PHYSICIAN ASSISTANT

## 2019-07-01 PROCEDURE — 85652 RBC SED RATE AUTOMATED: CPT | Performed by: PHYSICIAN ASSISTANT

## 2019-07-01 PROCEDURE — 36415 COLL VENOUS BLD VENIPUNCTURE: CPT | Performed by: PHYSICIAN ASSISTANT

## 2019-07-01 PROCEDURE — 80053 COMPREHEN METABOLIC PANEL: CPT | Performed by: PHYSICIAN ASSISTANT

## 2019-07-01 PROCEDURE — 84443 ASSAY THYROID STIM HORMONE: CPT | Performed by: PHYSICIAN ASSISTANT

## 2019-07-01 PROCEDURE — 99214 OFFICE O/P EST MOD 30 MIN: CPT | Performed by: PHYSICIAN ASSISTANT

## 2019-07-01 ASSESSMENT — ASTHMA QUESTIONNAIRES
QUESTION_1 LAST FOUR WEEKS HOW MUCH OF THE TIME DID YOUR ASTHMA KEEP YOU FROM GETTING AS MUCH DONE AT WORK, SCHOOL OR AT HOME: NONE OF THE TIME
QUESTION_3 LAST FOUR WEEKS HOW OFTEN DID YOUR ASTHMA SYMPTOMS (WHEEZING, COUGHING, SHORTNESS OF BREATH, CHEST TIGHTNESS OR PAIN) WAKE YOU UP AT NIGHT OR EARLIER THAN USUAL IN THE MORNING: NOT AT ALL
ACT_TOTALSCORE: 25
QUESTION_2 LAST FOUR WEEKS HOW OFTEN HAVE YOU HAD SHORTNESS OF BREATH: NOT AT ALL
QUESTION_5 LAST FOUR WEEKS HOW WOULD YOU RATE YOUR ASTHMA CONTROL: COMPLETELY CONTROLLED
QUESTION_4 LAST FOUR WEEKS HOW OFTEN HAVE YOU USED YOUR RESCUE INHALER OR NEBULIZER MEDICATION (SUCH AS ALBUTEROL): NOT AT ALL

## 2019-07-01 NOTE — PROGRESS NOTES
Subjective     Kirby Housre is a 24 year old female who presents to clinic today for the following health issues:    HPI   Eye(s) Problem      Duration: Since Jan    Description:  Location: right but sometimes left eye   Pain: no   Redness: YES  Discharge: no     Accompanying signs and symptoms: has seen optometry for this    Patient is wondering if there is something that could be causing and is sent by optometry to discuss this as it is recurring     Has not known what is causing this     Patient reports blurry vision, eyes feeling fatigued, some light sensitivity     Eyes have appeared swollen    Pt denies pain, itching or redness    History (Trauma, foreign body exposure,): None    Precipitating or alleviating factors (contact use): None    Therapies tried and outcome: Has used a steroid ointment on it which seemed to help    Patient notes fatigue, but reports due to stress at work     No body aches       Reviewed and updated as needed this visit by Provider  Meds  Problems  Fam Hx               Objective    /70   Pulse 80   Temp 98.6  F (37  C) (Oral)   Resp 16   Wt 96.4 kg (212 lb 9.6 oz)   LMP 06/10/2019 (Exact Date)   SpO2 99%   BMI 38.27 kg/m    Body mass index is 38.27 kg/m .  Physical Exam   GENERAL: healthy, alert and no distress  EYES: Eyes grossly normal to inspection, noted upper lid somewhat swollen and EOMI  RESP: lungs clear to auscultation - no rales, rhonchi or wheezes  CV: regular rates and rhythm, normal S1 S2, no S3 or S4 and no murmur, click or rub    Diagnostic Test Results:  Labs reviewed in Epic        Assessment & Plan     1. Irritation of both eyes  2. Blurred vision  - lab work up as below, if elevated inflammation markers, consider rheum referral otherwise referral to ophthalmology for further evaluation and treatment   - Comprehensive metabolic panel  - ESR: Erythrocyte sedimentation rate  - CBC with platelets and differential  - TSH with free T4 reflex  -  OPHTHALMOLOGY ADULT REFERRAL    Return in about 1 year (around 7/1/2020) for Physical Exam.    Giana Pinto PA-C  [facility]

## 2019-07-02 ASSESSMENT — ASTHMA QUESTIONNAIRES: ACT_TOTALSCORE: 25

## 2019-09-21 ENCOUNTER — OFFICE VISIT (OUTPATIENT)
Dept: URGENT CARE | Facility: URGENT CARE | Age: 24
End: 2019-09-21
Payer: COMMERCIAL

## 2019-09-21 VITALS
SYSTOLIC BLOOD PRESSURE: 124 MMHG | DIASTOLIC BLOOD PRESSURE: 66 MMHG | TEMPERATURE: 98.8 F | OXYGEN SATURATION: 99 % | BODY MASS INDEX: 38 KG/M2 | WEIGHT: 211.1 LBS | HEART RATE: 69 BPM

## 2019-09-21 DIAGNOSIS — R68.83 CHILLS (WITHOUT FEVER): ICD-10-CM

## 2019-09-21 DIAGNOSIS — R07.0 THROAT PAIN: Primary | ICD-10-CM

## 2019-09-21 DIAGNOSIS — J34.89 PURULENT NASAL DISCHARGE: ICD-10-CM

## 2019-09-21 DIAGNOSIS — R09.81 NASAL CONGESTION: ICD-10-CM

## 2019-09-21 LAB
DEPRECATED S PYO AG THROAT QL EIA: NORMAL
SPECIMEN SOURCE: NORMAL

## 2019-09-21 PROCEDURE — 87880 STREP A ASSAY W/OPTIC: CPT | Performed by: PHYSICIAN ASSISTANT

## 2019-09-21 PROCEDURE — 99214 OFFICE O/P EST MOD 30 MIN: CPT | Performed by: PHYSICIAN ASSISTANT

## 2019-09-21 PROCEDURE — 87081 CULTURE SCREEN ONLY: CPT | Performed by: PHYSICIAN ASSISTANT

## 2019-09-21 RX ORDER — AMOXICILLIN 875 MG
875 TABLET ORAL 2 TIMES DAILY
Qty: 20 TABLET | Refills: 0 | Status: SHIPPED | OUTPATIENT
Start: 2019-09-21 | End: 2019-10-01

## 2019-09-21 RX ORDER — FLUTICASONE PROPIONATE 50 MCG
1-2 SPRAY, SUSPENSION (ML) NASAL DAILY
Qty: 16 G | Refills: 0 | Status: SHIPPED | OUTPATIENT
Start: 2019-09-21

## 2019-09-22 LAB
BACTERIA SPEC CULT: NORMAL
SPECIMEN SOURCE: NORMAL

## 2019-09-23 NOTE — PROGRESS NOTES
SUBJECTIVE:   Kirby Houser is a 24 year old female presenting with a chief complaint of nasal congestion, throat pain, chills and cough.  Onset of symptoms was 6 day(s) ago.  Course of illness is worsening.    Severity moderate  Current and Associated symptoms: stuffy nose, cough - non-productive and facial pain/pressure  Treatment measures tried include OTC Cough med.  Predisposing factors include recent illness.    Past Medical History:   Diagnosis Date     Ovarian cyst, right      Uncomplicated asthma 2006     ALLERGIES   No Known Allergies    Family History   Adopted: Yes   Problem Relation Age of Onset     Diabetes Type 2  Father        Social History     Tobacco Use     Smoking status: Never Smoker     Smokeless tobacco: Never Used   Substance Use Topics     Alcohol use: Yes       ROS:  CONSTITUTIONAL:NEGATIVE for fever, chills, change in weight  INTEGUMENTARY/SKIN: NEGATIVE for worrisome rashes, moles or lesions  ENT/MOUTH: POSITIVE for sore throat and nasal congestion  RESP:POSITIVE for cough-non productive  CV: NEGATIVE for chest pain, palpitations or peripheral edema  GI: NEGATIVE for nausea, abdominal pain, heartburn, or change in bowel habits  : negative for and dysuria  MUSCULOSKELETAL: NEGATIVE for significant arthralgias or myalgia  NEURO: NEGATIVE for weakness, dizziness or paresthesias    OBJECTIVE  :/66   Pulse 69   Temp 98.8  F (37.1  C) (Oral)   Wt 95.8 kg (211 lb 1.6 oz)   SpO2 99%   BMI 38.00 kg/m    GENERAL APPEARANCE: healthy, alert and no distress  EYES: EOMI,  PERRL, conjunctiva clear  HENT: TM's normal bilaterally, nasal turbinates erythematous, swollen and rhinorrhea purulent  NECK: supple, nontender, no lymphadenopathy  RESP: lungs clear to auscultation - no rales, rhonchi or wheezes  CV: regular rates and rhythm, normal S1 S2, no murmur noted  ABDOMEN:  soft, nontender, no HSM or masses and bowel sounds normal  NEURO: Normal strength and tone, sensory exam grossly normal,   normal speech and mentation  SKIN: no suspicious lesions or rashes    Results for orders placed or performed in visit on 09/21/19   Strep, Rapid Screen   Result Value Ref Range    Specimen Description Throat     Rapid Strep A Screen       NEGATIVE: No Group A streptococcal antigen detected by immunoassay, await culture report.       ASSESSMENT/PLAN      ICD-10-CM    1. Throat pain R07.0 Strep, Rapid Screen     amoxicillin (AMOXIL) 875 MG tablet     Beta strep group A culture   2. Chills (without fever) R68.83 Strep, Rapid Screen   3. Purulent nasal discharge J34.89 amoxicillin (AMOXIL) 875 MG tablet   4. Nasal congestion R09.81 fluticasone (FLONASE) 50 MCG/ACT nasal spray       Orders Placed This Encounter     amoxicillin (AMOXIL) 875 MG tablet     fluticasone (FLONASE) 50 MCG/ACT nasal spray       Wash hands  Fluids, rest  Saline nasal spray  Follow up with PCP as needed  See orders in Epic

## 2020-03-10 ENCOUNTER — HEALTH MAINTENANCE LETTER (OUTPATIENT)
Age: 25
End: 2020-03-10

## 2020-07-29 DIAGNOSIS — Z30.41 ENCOUNTER FOR BIRTH CONTROL PILLS MAINTENANCE: ICD-10-CM

## 2020-07-30 RX ORDER — NORGESTREL-ETHINYL ESTRADIOL 0.3-0.03MG
TABLET ORAL
Qty: 28 TABLET | Refills: 0 | Status: SHIPPED | OUTPATIENT
Start: 2020-07-30

## 2020-07-30 NOTE — TELEPHONE ENCOUNTER
"Requested Prescriptions   Pending Prescriptions Disp Refills     CRYSELLE-28 0.3-30 MG-MCG tablet [Pharmacy Med Name: CRYSELLE TABLETS 28] 84 tablet 4     Sig: TAKE 1 TABLET BY MOUTH ONCE DAILY       Contraceptives Protocol Failed - 7/29/2020  5:58 PM        Failed - Recent (12 mo) or future (30 days) visit within the authorizing provider's specialty     Patient has had an office visit with the authorizing provider or a provider within the authorizing providers department within the previous 12 mos or has a future within next 30 days. See \"Patient Info\" tab in inbasket, or \"Choose Columns\" in Meds & Orders section of the refill encounter.              Passed - Patient is not a current smoker if age is 35 or older        Passed - Medication is active on med list        Passed - No active pregnancy on record        Passed - No positive pregnancy test in past 12 months           One month sent  Appointment needed for further refills  Sylvia Espinosa RN on 7/30/2020 at 10:47 AM    "

## 2020-08-31 DIAGNOSIS — Z30.41 ENCOUNTER FOR BIRTH CONTROL PILLS MAINTENANCE: ICD-10-CM

## 2020-09-01 RX ORDER — NORGESTREL AND ETHINYL ESTRADIOL 0.3-0.03MG
KIT ORAL
Qty: 28 TABLET | Refills: 0 | OUTPATIENT
Start: 2020-09-01

## 2020-09-01 NOTE — TELEPHONE ENCOUNTER
"Requested Prescriptions   Pending Prescriptions Disp Refills     LOW-OGESTREL 0.3-30 MG-MCG tablet [Pharmacy Med Name: LOW-OGESTREL TABLETS 28] 28 tablet 0     Sig: TAKE 1 TABLET BY MOUTH EVERY DAY       Contraceptives Protocol Failed - 8/31/2020  5:04 PM        Failed - Recent (12 mo) or future (30 days) visit within the authorizing provider's specialty     Patient has had an office visit with the authorizing provider or a provider within the authorizing providers department within the previous 12 mos or has a future within next 30 days. See \"Patient Info\" tab in inbasket, or \"Choose Columns\" in Meds & Orders section of the refill encounter.              Passed - Patient is not a current smoker if age is 35 or older        Passed - Medication is active on med list        Passed - No active pregnancy on record        Passed - No positive pregnancy test in past 12 months           Last Written Prescription Date:  7/30/20  Last Fill Quantity: 28,  # refills: 0   Last office visit: 6/26/2019 with prescribing provider:  YFN Vazquez CNM   Future Office Visit:      Pt due for annual, no appt scheduled. Pt already received one month extension. Rx denied.   Daniella Norris RN on 9/1/2020 at 7:39 AM      "

## 2020-09-08 DIAGNOSIS — Z30.41 ENCOUNTER FOR BIRTH CONTROL PILLS MAINTENANCE: ICD-10-CM

## 2020-09-09 RX ORDER — NORGESTREL AND ETHINYL ESTRADIOL 0.3-0.03MG
KIT ORAL
Qty: 28 TABLET | Refills: 0 | OUTPATIENT
Start: 2020-09-09

## 2020-09-09 NOTE — TELEPHONE ENCOUNTER
"Requested Prescriptions   Pending Prescriptions Disp Refills     LOW-OGESTREL 0.3-30 MG-MCG tablet [Pharmacy Med Name: LOW-OGESTREL TABLETS 28] 28 tablet 0     Sig: TAKE 1 TABLET BY MOUTH EVERY DAY       Contraceptives Protocol Failed - 9/8/2020  4:34 PM        Failed - Recent (12 mo) or future (30 days) visit within the authorizing provider's specialty     Patient has had an office visit with the authorizing provider or a provider within the authorizing providers department within the previous 12 mos or has a future within next 30 days. See \"Patient Info\" tab in inbasket, or \"Choose Columns\" in Meds & Orders section of the refill encounter.              Passed - Patient is not a current smoker if age is 35 or older        Passed - Medication is active on med list        Passed - No active pregnancy on record        Passed - No positive pregnancy test in past 12 months           Last Written Prescription Date:  7/30/20  Last Fill Quantity: 28,  # refills: 0   Last office visit: 6/26/2019 with prescribing provider:  George   Future Office Visit:      Pt due for annual, no appt scheduled. Pt already received one month extension. Rx denied.   Charlotte Adame RN on 9/9/2020 at 8:17 AM        "

## 2020-12-20 ENCOUNTER — HEALTH MAINTENANCE LETTER (OUTPATIENT)
Age: 25
End: 2020-12-20

## 2021-10-03 ENCOUNTER — HEALTH MAINTENANCE LETTER (OUTPATIENT)
Age: 26
End: 2021-10-03

## 2022-01-23 ENCOUNTER — HEALTH MAINTENANCE LETTER (OUTPATIENT)
Age: 27
End: 2022-01-23

## 2022-09-11 ENCOUNTER — HEALTH MAINTENANCE LETTER (OUTPATIENT)
Age: 27
End: 2022-09-11

## 2023-04-30 ENCOUNTER — HEALTH MAINTENANCE LETTER (OUTPATIENT)
Age: 28
End: 2023-04-30